# Patient Record
Sex: MALE | Race: WHITE | Employment: OTHER | URBAN - NONMETROPOLITAN AREA
[De-identification: names, ages, dates, MRNs, and addresses within clinical notes are randomized per-mention and may not be internally consistent; named-entity substitution may affect disease eponyms.]

---

## 2019-01-01 ENCOUNTER — ANESTHESIA EVENT (OUTPATIENT)
Dept: OPERATING ROOM | Age: 79
End: 2019-01-01
Payer: MEDICARE

## 2019-01-01 ENCOUNTER — TELEPHONE (OUTPATIENT)
Dept: UROLOGY | Age: 79
End: 2019-01-01

## 2019-01-01 ENCOUNTER — ANESTHESIA (OUTPATIENT)
Dept: OPERATING ROOM | Age: 79
End: 2019-01-01
Payer: MEDICARE

## 2019-01-01 ENCOUNTER — HOSPITAL ENCOUNTER (OUTPATIENT)
Age: 79
Setting detail: OUTPATIENT SURGERY
Discharge: HOME | End: 2019-12-18
Attending: UROLOGY | Admitting: UROLOGY
Payer: MEDICARE

## 2019-01-01 VITALS
SYSTOLIC BLOOD PRESSURE: 142 MMHG | TEMPERATURE: 97.2 F | DIASTOLIC BLOOD PRESSURE: 67 MMHG | BODY MASS INDEX: 27.09 KG/M2 | HEART RATE: 63 BPM | WEIGHT: 200 LBS | HEIGHT: 72 IN | OXYGEN SATURATION: 95 % | RESPIRATION RATE: 16 BRPM

## 2019-01-01 VITALS
DIASTOLIC BLOOD PRESSURE: 59 MMHG | OXYGEN SATURATION: 97 % | SYSTOLIC BLOOD PRESSURE: 109 MMHG | RESPIRATION RATE: 19 BRPM

## 2019-01-01 LAB
CASE NUMBER:: NORMAL
SPECIMEN DESCRIPTION: NORMAL
SURGICAL PATHOLOGY REPORT: NORMAL

## 2019-01-01 PROCEDURE — 3700000000 HC ANESTHESIA ATTENDED CARE: Performed by: UROLOGY

## 2019-01-01 PROCEDURE — 6370000000 HC RX 637 (ALT 250 FOR IP): Performed by: UROLOGY

## 2019-01-01 PROCEDURE — 3600000012 HC SURGERY LEVEL 2 ADDTL 15MIN: Performed by: UROLOGY

## 2019-01-01 PROCEDURE — 3700000001 HC ADD 15 MINUTES (ANESTHESIA): Performed by: UROLOGY

## 2019-01-01 PROCEDURE — 7100000011 HC PHASE II RECOVERY - ADDTL 15 MIN: Performed by: UROLOGY

## 2019-01-01 PROCEDURE — 6360000002 HC RX W HCPCS: Performed by: NURSE ANESTHETIST, CERTIFIED REGISTERED

## 2019-01-01 PROCEDURE — 2580000003 HC RX 258: Performed by: UROLOGY

## 2019-01-01 PROCEDURE — 7100000010 HC PHASE II RECOVERY - FIRST 15 MIN: Performed by: UROLOGY

## 2019-01-01 PROCEDURE — 2500000003 HC RX 250 WO HCPCS: Performed by: NURSE ANESTHETIST, CERTIFIED REGISTERED

## 2019-01-01 PROCEDURE — 3600000002 HC SURGERY LEVEL 2 BASE: Performed by: UROLOGY

## 2019-01-01 PROCEDURE — 88112 CYTOPATH CELL ENHANCE TECH: CPT

## 2019-01-01 PROCEDURE — 2709999900 HC NON-CHARGEABLE SUPPLY: Performed by: UROLOGY

## 2019-01-01 RX ORDER — LIDOCAINE HYDROCHLORIDE 20 MG/ML
INJECTION, SOLUTION EPIDURAL; INFILTRATION; INTRACAUDAL; PERINEURAL PRN
Status: DISCONTINUED | OUTPATIENT
Start: 2019-01-01 | End: 2019-01-01 | Stop reason: SDUPTHER

## 2019-01-01 RX ORDER — PROPOFOL 10 MG/ML
INJECTION, EMULSION INTRAVENOUS PRN
Status: DISCONTINUED | OUTPATIENT
Start: 2019-01-01 | End: 2019-01-01 | Stop reason: SDUPTHER

## 2019-01-01 RX ORDER — SODIUM CHLORIDE 0.9 % (FLUSH) 0.9 %
10 SYRINGE (ML) INJECTION EVERY 12 HOURS SCHEDULED
Status: DISCONTINUED | OUTPATIENT
Start: 2019-01-01 | End: 2019-01-01 | Stop reason: HOSPADM

## 2019-01-01 RX ORDER — SODIUM CHLORIDE, SODIUM LACTATE, POTASSIUM CHLORIDE, CALCIUM CHLORIDE 600; 310; 30; 20 MG/100ML; MG/100ML; MG/100ML; MG/100ML
INJECTION, SOLUTION INTRAVENOUS CONTINUOUS
Status: DISCONTINUED | OUTPATIENT
Start: 2019-01-01 | End: 2019-01-01 | Stop reason: HOSPADM

## 2019-01-01 RX ORDER — LIDOCAINE HYDROCHLORIDE 20 MG/ML
JELLY TOPICAL PRN
Status: DISCONTINUED | OUTPATIENT
Start: 2019-01-01 | End: 2019-01-01 | Stop reason: ALTCHOICE

## 2019-01-01 RX ORDER — SODIUM CHLORIDE 0.9 % (FLUSH) 0.9 %
10 SYRINGE (ML) INJECTION PRN
Status: DISCONTINUED | OUTPATIENT
Start: 2019-01-01 | End: 2019-01-01 | Stop reason: HOSPADM

## 2019-01-01 RX ADMIN — LIDOCAINE HYDROCHLORIDE 100 MG: 20 INJECTION, SOLUTION EPIDURAL; INFILTRATION; INTRACAUDAL; PERINEURAL at 12:27

## 2019-01-01 RX ADMIN — PROPOFOL 50 MG: 10 INJECTION, EMULSION INTRAVENOUS at 12:41

## 2019-01-01 RX ADMIN — PROPOFOL 50 MG: 10 INJECTION, EMULSION INTRAVENOUS at 12:27

## 2019-01-01 RX ADMIN — SODIUM CHLORIDE, POTASSIUM CHLORIDE, SODIUM LACTATE AND CALCIUM CHLORIDE: 600; 310; 30; 20 INJECTION, SOLUTION INTRAVENOUS at 12:19

## 2019-01-01 RX ADMIN — PROPOFOL 50 MG: 10 INJECTION, EMULSION INTRAVENOUS at 12:34

## 2019-01-01 ASSESSMENT — PAIN - FUNCTIONAL ASSESSMENT: PAIN_FUNCTIONAL_ASSESSMENT: 0-10

## 2019-01-01 ASSESSMENT — COPD QUESTIONNAIRES: CAT_SEVERITY: MODERATE

## 2019-01-01 ASSESSMENT — PAIN SCALES - GENERAL: PAINLEVEL_OUTOF10: 0

## 2019-06-28 ENCOUNTER — OFFICE VISIT (OUTPATIENT)
Dept: UROLOGY | Age: 79
End: 2019-06-28
Payer: MEDICARE

## 2019-06-28 VITALS
SYSTOLIC BLOOD PRESSURE: 122 MMHG | BODY MASS INDEX: 27.77 KG/M2 | WEIGHT: 205 LBS | OXYGEN SATURATION: 99 % | DIASTOLIC BLOOD PRESSURE: 62 MMHG | HEIGHT: 72 IN | HEART RATE: 64 BPM

## 2019-06-28 DIAGNOSIS — R35.0 URINARY FREQUENCY: ICD-10-CM

## 2019-06-28 DIAGNOSIS — N13.8 BPH WITH OBSTRUCTION/LOWER URINARY TRACT SYMPTOMS: ICD-10-CM

## 2019-06-28 DIAGNOSIS — N39.41 URGE INCONTINENCE: Primary | ICD-10-CM

## 2019-06-28 DIAGNOSIS — R39.15 URGENCY OF URINATION: ICD-10-CM

## 2019-06-28 DIAGNOSIS — N40.1 BPH WITH OBSTRUCTION/LOWER URINARY TRACT SYMPTOMS: ICD-10-CM

## 2019-06-28 PROCEDURE — 4004F PT TOBACCO SCREEN RCVD TLK: CPT | Performed by: UROLOGY

## 2019-06-28 PROCEDURE — 1123F ACP DISCUSS/DSCN MKR DOCD: CPT | Performed by: UROLOGY

## 2019-06-28 PROCEDURE — 4040F PNEUMOC VAC/ADMIN/RCVD: CPT | Performed by: UROLOGY

## 2019-06-28 PROCEDURE — G8598 ASA/ANTIPLAT THER USED: HCPCS | Performed by: UROLOGY

## 2019-06-28 PROCEDURE — G8419 CALC BMI OUT NRM PARAM NOF/U: HCPCS | Performed by: UROLOGY

## 2019-06-28 PROCEDURE — 99204 OFFICE O/P NEW MOD 45 MIN: CPT | Performed by: UROLOGY

## 2019-06-28 PROCEDURE — G8427 DOCREV CUR MEDS BY ELIG CLIN: HCPCS | Performed by: UROLOGY

## 2019-06-28 RX ORDER — OXYBUTYNIN CHLORIDE 10 MG/1
10 TABLET, EXTENDED RELEASE ORAL DAILY
Qty: 30 TABLET | Refills: 3 | Status: SHIPPED | OUTPATIENT
Start: 2019-06-28 | End: 2019-08-14 | Stop reason: SDUPTHER

## 2019-06-28 RX ORDER — TAMSULOSIN HYDROCHLORIDE 0.4 MG/1
CAPSULE ORAL
COMMUNITY
Start: 2019-06-12 | End: 2020-01-01 | Stop reason: SDUPTHER

## 2019-06-28 NOTE — PROGRESS NOTES
Desiree Salinas MD   Urology Clinic Consultation / New Patient Visit      Patient:  Caitlyn Howard  YOB: 1940  Date: 6/28/2019    HISTORY OF PRESENT ILLNESS:   The patient is a [de-identified] y.o. male who presents today for evaluation of the following problem(s): OAB  Overall the problem(s) : are worsening. Associated Symptoms: No dysuria, gross hematuria. Pain Severity: Pain Score:   0 - No pain    Summary of old records: BPH on flomax  (Patient's old records, notes and chart reviewed and summarized above.)    Duration: 2 months  Location: bladder  alleviating factors: none  Aggravating factors: none  Associated with: no hematuria, weak stream  Frequency: constant  On flomax for BPH for 2 years  Nocturia 6x Daytime q1-2h      Last several PSA's:  Lab Results   Component Value Date    PSA 4.12 (H) 08/09/2013       Last total testosterone:  No results found for: TESTOSTERONE    Urinalysis today:  No results found for this visit on 06/28/19.       Last BUN and creatinine:  Lab Results   Component Value Date    BUN 25 (H) 08/09/2013     Lab Results   Component Value Date    CREATININE 1.50 (H) 08/09/2013       Imaging Reviewed during this Office Visit:   (results were independently reviewed by physician and radiology report verified)    PAST MEDICAL, FAMILY AND SOCIAL HISTORY:  Past Medical History:   Diagnosis Date    Abdominal aortic aneurysm ([address]     Abnormal non-fasting glucose     Balance problem     BPH (benign prostatic hypertrophy)     CAD (coronary artery disease)     Chronic low back pain     Depression     Gait difficulty     Glaucoma     Hypertension     Hypertriglyceridemia     Liver cyst     Lumbosacral spondylosis     Osteoarthritis     Parkinson disease ([address]     Thyroid nodule     Left lobe    Tremor      Past Surgical History:   Procedure Laterality Date    ABDOMINAL AORTIC ANEURYSM REPAIR  08-    COLONOSCOPY      HEMORRHOID SURGERY      KNEE SURGERY      left side  total replacement     PTCA  01-    SKIN CANCER EXCISION       Family History   Problem Relation Age of Onset    Heart Attack Father      Outpatient Medications Marked as Taking for the 6/28/19 encounter (Office Visit) with Adam Guillen MD   Medication Sig Dispense Refill    tamsulosin (FLOMAX) 0.4 MG capsule tamsulosin 0.4 mg capsule      oxybutynin (DITROPAN XL) 10 MG extended release tablet Take 1 tablet by mouth daily 30 tablet 3    carbidopa-levodopa (SINEMET)  MG per tablet TAKE 1 TABLET BY MOUTH 4 TIMES DAILY 360 tablet 1    fenofibrate micronized (LOFIBRA) 134 MG capsule       timolol (TIMOPTIC) 0.5 % ophthalmic solution       hydrochlorothiazide (HYDRODIURIL) 12.5 MG tablet Take 12.5 mg by mouth daily      fenofibrate (TRICOR) 145 MG tablet Take 145 mg by mouth daily      ipratropium (ATROVENT) 0.02 % nebulizer solution Take 5 mg by nebulization daily       amLODIPine (NORVASC) 10 MG tablet Take 10 mg by mouth daily.  atorvastatin (LIPITOR) 40 MG tablet Take 40 mg by mouth daily.  nitroGLYCERIN (NITROSTAT) 0.4 MG SL tablet Place 0.4 mg under the tongue every 5 minutes as needed.  clopidogrel (PLAVIX) 75 MG tablet Take 75 mg by mouth daily.  acetaminophen (TYLENOL) 325 MG tablet Take 650 mg by mouth every 6 hours as needed.  carvedilol (COREG) 12.5 MG tablet Take 12.5 mg by mouth 2 times daily. Patient has no known allergies.   Social History     Tobacco Use   Smoking Status Current Some Day Smoker    Packs/day: 0.30    Years: 60.00    Pack years: 18.00   Smokeless Tobacco Never Used       Social History     Substance and Sexual Activity   Alcohol Use Yes    Alcohol/week: 0.0 oz       REVIEW OF SYSTEMS:  Constitutional: negative  Eyes: negative  Respiratory: negative  Cardiovascular: negative  Gastrointestinal: negative  Musculoskeletal: negative  Genitourinary: negative except for what is in HPI  Skin: negative   Neurological: negative  Hematological/Lymphatic: negative  Psychological: negative    Physical Exam:    This a [de-identified] y.o. male   Vitals:    06/28/19 1055   BP: 122/62   Pulse: 64   SpO2: 99%     Constitutional: Patient in no acute distress; Neuro: alert and oriented to person place and time. Psych: Mood and affect normal.  Skin: Normal  Lungs: Respiratory effort normal  Cardiovascular:  Normal peripheral pulses  Abdomen: Soft, non-tender, non-distended with no CVA, flank pain, hepatosplenomegaly or hernia. Kidneys normal.  Bladder non-tender and not distended. Lymphatics: no palpable lymphadenopathy    Assessment and Plan      1. Urge incontinence    2. Urinary frequency    3. Urgency of urination    4. BPH with obstruction/lower urinary tract symptoms           Plan:       OAB: Patient instructed to avoid bladder irritants in diet such as coffee, tea, caffeine, alcohol, carbonated beverages, spicy/acidic foods. Patient given a list of these to avoid. Will trial Ditropan 10 mg daily    BPH: continue flomax    Return in about 6 weeks (around 8/9/2019).              Rashi Gilmore MD  [facility]

## 2019-07-29 DIAGNOSIS — M25.562 CHRONIC PAIN OF LEFT KNEE: Primary | ICD-10-CM

## 2019-07-29 DIAGNOSIS — G89.29 CHRONIC PAIN OF LEFT KNEE: Primary | ICD-10-CM

## 2019-08-06 ENCOUNTER — OFFICE VISIT (OUTPATIENT)
Dept: ORTHOPEDIC SURGERY | Age: 79
End: 2019-08-06
Payer: MEDICARE

## 2019-08-06 ENCOUNTER — HOSPITAL ENCOUNTER (OUTPATIENT)
Dept: GENERAL RADIOLOGY | Age: 79
Discharge: HOME | End: 2019-08-08
Payer: MEDICARE

## 2019-08-06 VITALS
DIASTOLIC BLOOD PRESSURE: 64 MMHG | OXYGEN SATURATION: 98 % | SYSTOLIC BLOOD PRESSURE: 100 MMHG | BODY MASS INDEX: 27.77 KG/M2 | HEIGHT: 72 IN | WEIGHT: 205 LBS | HEART RATE: 64 BPM

## 2019-08-06 DIAGNOSIS — M25.562 CHRONIC PAIN OF LEFT KNEE: ICD-10-CM

## 2019-08-06 DIAGNOSIS — M16.12 PRIMARY OSTEOARTHRITIS OF LEFT HIP: Primary | ICD-10-CM

## 2019-08-06 DIAGNOSIS — G89.29 CHRONIC PAIN OF LEFT KNEE: ICD-10-CM

## 2019-08-06 PROCEDURE — 4004F PT TOBACCO SCREEN RCVD TLK: CPT | Performed by: NURSE PRACTITIONER

## 2019-08-06 PROCEDURE — G8427 DOCREV CUR MEDS BY ELIG CLIN: HCPCS | Performed by: NURSE PRACTITIONER

## 2019-08-06 PROCEDURE — G8598 ASA/ANTIPLAT THER USED: HCPCS | Performed by: NURSE PRACTITIONER

## 2019-08-06 PROCEDURE — G8419 CALC BMI OUT NRM PARAM NOF/U: HCPCS | Performed by: NURSE PRACTITIONER

## 2019-08-06 PROCEDURE — 99202 OFFICE O/P NEW SF 15 MIN: CPT | Performed by: NURSE PRACTITIONER

## 2019-08-06 PROCEDURE — 4040F PNEUMOC VAC/ADMIN/RCVD: CPT | Performed by: NURSE PRACTITIONER

## 2019-08-06 PROCEDURE — 73562 X-RAY EXAM OF KNEE 3: CPT

## 2019-08-06 PROCEDURE — 1123F ACP DISCUSS/DSCN MKR DOCD: CPT | Performed by: NURSE PRACTITIONER

## 2019-08-13 ENCOUNTER — HOSPITAL ENCOUNTER (OUTPATIENT)
Dept: GENERAL RADIOLOGY | Age: 79
Discharge: HOME | End: 2019-08-15
Payer: MEDICARE

## 2019-08-13 DIAGNOSIS — M16.12 PRIMARY OSTEOARTHRITIS OF LEFT HIP: ICD-10-CM

## 2019-08-13 PROCEDURE — 20610 DRAIN/INJ JOINT/BURSA W/O US: CPT

## 2019-08-13 PROCEDURE — 2500000003 HC RX 250 WO HCPCS

## 2019-08-13 PROCEDURE — 6360000004 HC RX CONTRAST MEDICATION: Performed by: NURSE PRACTITIONER

## 2019-08-13 PROCEDURE — 77002 NEEDLE LOCALIZATION BY XRAY: CPT

## 2019-08-13 PROCEDURE — 6360000002 HC RX W HCPCS

## 2019-08-13 RX ADMIN — IOHEXOL 2 ML: 240 INJECTION, SOLUTION INTRATHECAL; INTRAVASCULAR; INTRAVENOUS; ORAL at 15:19

## 2019-08-14 ENCOUNTER — OFFICE VISIT (OUTPATIENT)
Dept: UROLOGY | Age: 79
End: 2019-08-14
Payer: MEDICARE

## 2019-08-14 VITALS
HEART RATE: 74 BPM | DIASTOLIC BLOOD PRESSURE: 62 MMHG | WEIGHT: 205.03 LBS | SYSTOLIC BLOOD PRESSURE: 112 MMHG | HEIGHT: 72 IN | BODY MASS INDEX: 27.77 KG/M2

## 2019-08-14 DIAGNOSIS — R39.15 URGENCY OF URINATION: ICD-10-CM

## 2019-08-14 DIAGNOSIS — N40.1 BPH WITH OBSTRUCTION/LOWER URINARY TRACT SYMPTOMS: ICD-10-CM

## 2019-08-14 DIAGNOSIS — N13.8 BPH WITH OBSTRUCTION/LOWER URINARY TRACT SYMPTOMS: ICD-10-CM

## 2019-08-14 DIAGNOSIS — N39.41 URGE INCONTINENCE: Primary | ICD-10-CM

## 2019-08-14 DIAGNOSIS — R35.0 URINARY FREQUENCY: ICD-10-CM

## 2019-08-14 PROCEDURE — G8598 ASA/ANTIPLAT THER USED: HCPCS | Performed by: UROLOGY

## 2019-08-14 PROCEDURE — G8427 DOCREV CUR MEDS BY ELIG CLIN: HCPCS | Performed by: UROLOGY

## 2019-08-14 PROCEDURE — 4004F PT TOBACCO SCREEN RCVD TLK: CPT | Performed by: UROLOGY

## 2019-08-14 PROCEDURE — 4040F PNEUMOC VAC/ADMIN/RCVD: CPT | Performed by: UROLOGY

## 2019-08-14 PROCEDURE — 99214 OFFICE O/P EST MOD 30 MIN: CPT | Performed by: UROLOGY

## 2019-08-14 PROCEDURE — G8419 CALC BMI OUT NRM PARAM NOF/U: HCPCS | Performed by: UROLOGY

## 2019-08-14 PROCEDURE — 1123F ACP DISCUSS/DSCN MKR DOCD: CPT | Performed by: UROLOGY

## 2019-08-14 RX ORDER — OXYBUTYNIN CHLORIDE 10 MG/1
10 TABLET, EXTENDED RELEASE ORAL DAILY
Qty: 90 TABLET | Refills: 1 | Status: SHIPPED | OUTPATIENT
Start: 2019-08-14 | End: 2020-01-01 | Stop reason: SDUPTHER

## 2019-10-30 ENCOUNTER — OFFICE VISIT (OUTPATIENT)
Dept: UROLOGY | Age: 79
End: 2019-10-30
Payer: MEDICARE

## 2019-10-30 VITALS
RESPIRATION RATE: 16 BRPM | DIASTOLIC BLOOD PRESSURE: 58 MMHG | BODY MASS INDEX: 26.41 KG/M2 | HEART RATE: 80 BPM | WEIGHT: 195 LBS | HEIGHT: 72 IN | SYSTOLIC BLOOD PRESSURE: 102 MMHG

## 2019-10-30 DIAGNOSIS — R39.15 URGENCY OF URINATION: ICD-10-CM

## 2019-10-30 DIAGNOSIS — N13.8 BPH WITH OBSTRUCTION/LOWER URINARY TRACT SYMPTOMS: ICD-10-CM

## 2019-10-30 DIAGNOSIS — N40.1 BPH WITH OBSTRUCTION/LOWER URINARY TRACT SYMPTOMS: ICD-10-CM

## 2019-10-30 DIAGNOSIS — R35.0 URINARY FREQUENCY: ICD-10-CM

## 2019-10-30 DIAGNOSIS — N39.41 URGE INCONTINENCE: Primary | ICD-10-CM

## 2019-10-30 PROCEDURE — 4040F PNEUMOC VAC/ADMIN/RCVD: CPT | Performed by: UROLOGY

## 2019-10-30 PROCEDURE — G8598 ASA/ANTIPLAT THER USED: HCPCS | Performed by: UROLOGY

## 2019-10-30 PROCEDURE — G8484 FLU IMMUNIZE NO ADMIN: HCPCS | Performed by: UROLOGY

## 2019-10-30 PROCEDURE — 1123F ACP DISCUSS/DSCN MKR DOCD: CPT | Performed by: UROLOGY

## 2019-10-30 PROCEDURE — G8417 CALC BMI ABV UP PARAM F/U: HCPCS | Performed by: UROLOGY

## 2019-10-30 PROCEDURE — G8427 DOCREV CUR MEDS BY ELIG CLIN: HCPCS | Performed by: UROLOGY

## 2019-10-30 PROCEDURE — 1036F TOBACCO NON-USER: CPT | Performed by: UROLOGY

## 2019-10-30 PROCEDURE — 99213 OFFICE O/P EST LOW 20 MIN: CPT | Performed by: UROLOGY

## 2019-10-30 SDOH — HEALTH STABILITY: MENTAL HEALTH: HOW OFTEN DO YOU HAVE A DRINK CONTAINING ALCOHOL?: NEVER

## 2019-10-31 ENCOUNTER — TELEPHONE (OUTPATIENT)
Dept: UROLOGY | Age: 79
End: 2019-10-31

## 2020-01-01 ENCOUNTER — NURSE ONLY (OUTPATIENT)
Dept: UROLOGY | Age: 80
End: 2020-01-01
Payer: MEDICARE

## 2020-01-01 ENCOUNTER — ANESTHESIA EVENT (OUTPATIENT)
Dept: OPERATING ROOM | Age: 80
End: 2020-01-01
Payer: MEDICARE

## 2020-01-01 ENCOUNTER — HOSPITAL ENCOUNTER (OUTPATIENT)
Age: 80
Setting detail: OUTPATIENT SURGERY
Discharge: HOME | End: 2020-03-11
Attending: UROLOGY | Admitting: UROLOGY
Payer: MEDICARE

## 2020-01-01 ENCOUNTER — TELEPHONE (OUTPATIENT)
Dept: UROLOGY | Age: 80
End: 2020-01-01

## 2020-01-01 ENCOUNTER — TELEPHONE (OUTPATIENT)
Dept: GASTROENTEROLOGY | Age: 80
End: 2020-01-01

## 2020-01-01 ENCOUNTER — TELEPHONE (OUTPATIENT)
Dept: SURGERY | Age: 80
End: 2020-01-01

## 2020-01-01 ENCOUNTER — APPOINTMENT (OUTPATIENT)
Dept: GENERAL RADIOLOGY | Age: 80
End: 2020-01-01
Payer: MEDICARE

## 2020-01-01 ENCOUNTER — OFFICE VISIT (OUTPATIENT)
Dept: UROLOGY | Age: 80
End: 2020-01-01
Payer: MEDICARE

## 2020-01-01 ENCOUNTER — APPOINTMENT (OUTPATIENT)
Dept: GENERAL RADIOLOGY | Age: 80
End: 2020-01-01
Attending: UROLOGY
Payer: MEDICARE

## 2020-01-01 ENCOUNTER — HOSPITAL ENCOUNTER (EMERGENCY)
Age: 80
Discharge: HOME | End: 2020-08-19
Attending: EMERGENCY MEDICINE
Payer: MEDICARE

## 2020-01-01 ENCOUNTER — ANESTHESIA (OUTPATIENT)
Dept: OPERATING ROOM | Age: 80
End: 2020-01-01
Payer: MEDICARE

## 2020-01-01 ENCOUNTER — OFFICE VISIT (OUTPATIENT)
Dept: GASTROENTEROLOGY | Age: 80
End: 2020-01-01
Payer: MEDICARE

## 2020-01-01 ENCOUNTER — HOSPITAL ENCOUNTER (OUTPATIENT)
Age: 80
Setting detail: OBSERVATION
Discharge: HOME | End: 2020-01-23
Attending: UROLOGY | Admitting: INTERNAL MEDICINE
Payer: MEDICARE

## 2020-01-01 ENCOUNTER — INITIAL CONSULT (OUTPATIENT)
Dept: SURGERY | Age: 80
End: 2020-01-01
Payer: MEDICARE

## 2020-01-01 ENCOUNTER — PROCEDURE VISIT (OUTPATIENT)
Dept: UROLOGY | Age: 80
End: 2020-01-01
Payer: MEDICARE

## 2020-01-01 VITALS — DIASTOLIC BLOOD PRESSURE: 52 MMHG | SYSTOLIC BLOOD PRESSURE: 116 MMHG | HEART RATE: 60 BPM | TEMPERATURE: 99.2 F

## 2020-01-01 VITALS
WEIGHT: 220.02 LBS | BODY MASS INDEX: 29.8 KG/M2 | HEART RATE: 72 BPM | DIASTOLIC BLOOD PRESSURE: 58 MMHG | HEIGHT: 72 IN | OXYGEN SATURATION: 94 % | RESPIRATION RATE: 16 BRPM | SYSTOLIC BLOOD PRESSURE: 102 MMHG

## 2020-01-01 VITALS
HEART RATE: 60 BPM | BODY MASS INDEX: 29.83 KG/M2 | TEMPERATURE: 96.9 F | HEIGHT: 72 IN | DIASTOLIC BLOOD PRESSURE: 60 MMHG | SYSTOLIC BLOOD PRESSURE: 115 MMHG

## 2020-01-01 VITALS
DIASTOLIC BLOOD PRESSURE: 70 MMHG | OXYGEN SATURATION: 93 % | SYSTOLIC BLOOD PRESSURE: 154 MMHG | WEIGHT: 220 LBS | RESPIRATION RATE: 18 BRPM | TEMPERATURE: 98.2 F | BODY MASS INDEX: 29.8 KG/M2 | HEART RATE: 61 BPM | HEIGHT: 72 IN

## 2020-01-01 VITALS
RESPIRATION RATE: 18 BRPM | BODY MASS INDEX: 27.36 KG/M2 | OXYGEN SATURATION: 97 % | SYSTOLIC BLOOD PRESSURE: 131 MMHG | TEMPERATURE: 98.8 F | HEART RATE: 68 BPM | DIASTOLIC BLOOD PRESSURE: 63 MMHG | HEIGHT: 72 IN | WEIGHT: 202 LBS

## 2020-01-01 VITALS
DIASTOLIC BLOOD PRESSURE: 56 MMHG | OXYGEN SATURATION: 100 % | RESPIRATION RATE: 17 BRPM | SYSTOLIC BLOOD PRESSURE: 105 MMHG | TEMPERATURE: 97.3 F

## 2020-01-01 VITALS
HEART RATE: 69 BPM | SYSTOLIC BLOOD PRESSURE: 135 MMHG | WEIGHT: 220 LBS | HEIGHT: 72 IN | RESPIRATION RATE: 18 BRPM | DIASTOLIC BLOOD PRESSURE: 56 MMHG | OXYGEN SATURATION: 96 % | BODY MASS INDEX: 29.8 KG/M2 | TEMPERATURE: 98.8 F

## 2020-01-01 VITALS
OXYGEN SATURATION: 100 % | RESPIRATION RATE: 13 BRPM | SYSTOLIC BLOOD PRESSURE: 89 MMHG | DIASTOLIC BLOOD PRESSURE: 51 MMHG | TEMPERATURE: 96.3 F

## 2020-01-01 VITALS — BODY MASS INDEX: 27.4 KG/M2 | WEIGHT: 202 LBS | RESPIRATION RATE: 18 BRPM | TEMPERATURE: 97.3 F

## 2020-01-01 VITALS — SYSTOLIC BLOOD PRESSURE: 120 MMHG | DIASTOLIC BLOOD PRESSURE: 80 MMHG | HEART RATE: 55 BPM

## 2020-01-01 LAB
ABSOLUTE EOS #: 0 K/UL (ref 0–0.4)
ABSOLUTE EOS #: <0.03 K/UL (ref 0–0.44)
ABSOLUTE EOS #: <0.03 K/UL (ref 0–0.44)
ABSOLUTE IMMATURE GRANULOCYTE: 0 K/UL (ref 0–0.3)
ABSOLUTE IMMATURE GRANULOCYTE: 0.04 K/UL (ref 0–0.3)
ABSOLUTE IMMATURE GRANULOCYTE: 0.05 K/UL (ref 0–0.3)
ABSOLUTE LYMPH #: 0.27 K/UL (ref 1–4.8)
ABSOLUTE LYMPH #: 0.75 K/UL (ref 1.1–3.7)
ABSOLUTE LYMPH #: 0.95 K/UL (ref 1.1–3.7)
ABSOLUTE MONO #: 0.14 K/UL (ref 0.1–1.2)
ABSOLUTE MONO #: 0.18 K/UL (ref 0.1–1.2)
ABSOLUTE MONO #: 0.38 K/UL (ref 0.1–1.2)
ALBUMIN SERPL-MCNC: 4.1 G/DL (ref 3.5–5.2)
ALBUMIN/GLOBULIN RATIO: 1.1 (ref 1–2.5)
ALP BLD-CCNC: 96 U/L (ref 40–129)
ALT SERPL-CCNC: 13 U/L (ref 5–41)
ANION GAP SERPL CALCULATED.3IONS-SCNC: 10 MMOL/L (ref 9–17)
ANION GAP SERPL CALCULATED.3IONS-SCNC: 13 MMOL/L (ref 9–17)
ANION GAP SERPL CALCULATED.3IONS-SCNC: 14 MMOL/L (ref 9–17)
AST SERPL-CCNC: 19 U/L
ATYPICAL LYMPHOCYTE ABSOLUTE COUNT: 0.03 K/UL
ATYPICAL LYMPHOCYTES: 1 %
BASOPHILS # BLD: 0 % (ref 0–2)
BASOPHILS ABSOLUTE: 0 K/UL (ref 0–0.2)
BASOPHILS ABSOLUTE: <0.03 K/UL (ref 0–0.2)
BASOPHILS ABSOLUTE: <0.03 K/UL (ref 0–0.2)
BILIRUB SERPL-MCNC: 0.59 MG/DL (ref 0.3–1.2)
BILIRUBIN DIRECT: 0.21 MG/DL
BILIRUBIN, INDIRECT: 0.38 MG/DL (ref 0–1)
BUN BLDV-MCNC: 26 MG/DL (ref 8–23)
BUN BLDV-MCNC: 27 MG/DL (ref 8–23)
BUN BLDV-MCNC: 31 MG/DL (ref 8–23)
BUN/CREAT BLD: 24 (ref 9–20)
BUN/CREAT BLD: 26 (ref 9–20)
CALCIUM SERPL-MCNC: 8.9 MG/DL (ref 8.6–10.4)
CALCIUM SERPL-MCNC: 9.1 MG/DL (ref 8.6–10.4)
CALCIUM SERPL-MCNC: 9.2 MG/DL (ref 8.6–10.4)
CHLORIDE BLD-SCNC: 102 MMOL/L (ref 98–107)
CHLORIDE BLD-SCNC: 103 MMOL/L (ref 98–107)
CHLORIDE BLD-SCNC: 103 MMOL/L (ref 98–107)
CO2: 24 MMOL/L (ref 20–31)
CO2: 24 MMOL/L (ref 20–31)
CO2: 28 MMOL/L (ref 20–31)
CREAT SERPL-MCNC: 1.01 MG/DL (ref 0.7–1.2)
CREAT SERPL-MCNC: 1.08 MG/DL (ref 0.7–1.2)
CREAT SERPL-MCNC: 1.2 MG/DL (ref 0.7–1.2)
DIFFERENTIAL TYPE: ABNORMAL
EKG ATRIAL RATE: 61 BPM
EKG ATRIAL RATE: 68 BPM
EKG P AXIS: -7 DEGREES
EKG P AXIS: 34 DEGREES
EKG P-R INTERVAL: 160 MS
EKG P-R INTERVAL: 162 MS
EKG Q-T INTERVAL: 398 MS
EKG Q-T INTERVAL: 426 MS
EKG QRS DURATION: 84 MS
EKG QRS DURATION: 88 MS
EKG QTC CALCULATION (BAZETT): 423 MS
EKG QTC CALCULATION (BAZETT): 428 MS
EKG R AXIS: 40 DEGREES
EKG R AXIS: 47 DEGREES
EKG T AXIS: 32 DEGREES
EKG T AXIS: 49 DEGREES
EKG VENTRICULAR RATE: 61 BPM
EKG VENTRICULAR RATE: 68 BPM
EOSINOPHILS RELATIVE PERCENT: 0 % (ref 1–4)
EOSINOPHILS RELATIVE PERCENT: 0 % (ref 1–8)
EOSINOPHILS RELATIVE PERCENT: 1 % (ref 1–4)
GFR AFRICAN AMERICAN: >60 ML/MIN
GFR NON-AFRICAN AMERICAN: 58 ML/MIN
GFR NON-AFRICAN AMERICAN: >60 ML/MIN
GFR NON-AFRICAN AMERICAN: >60 ML/MIN
GFR SERPL CREATININE-BSD FRML MDRD: ABNORMAL ML/MIN/{1.73_M2}
GLUCOSE BLD-MCNC: 117 MG/DL (ref 70–99)
GLUCOSE BLD-MCNC: 131 MG/DL (ref 70–99)
GLUCOSE BLD-MCNC: 156 MG/DL (ref 70–99)
HCT VFR BLD CALC: 25.8 % (ref 40.7–50.3)
HCT VFR BLD CALC: 29.7 % (ref 40.7–50.3)
HCT VFR BLD CALC: 33.8 % (ref 40.7–50.3)
HEMOGLOBIN: 10.1 G/DL (ref 13–17)
HEMOGLOBIN: 7.5 G/DL (ref 13–17)
HEMOGLOBIN: 9.2 G/DL (ref 13–17)
IMMATURE GRANULOCYTES: 0 %
IMMATURE GRANULOCYTES: 1 %
IMMATURE GRANULOCYTES: 2 %
INR BLD: 1.2
LYMPHOCYTES # BLD: 10 % (ref 15–43)
LYMPHOCYTES # BLD: 18 % (ref 24–43)
LYMPHOCYTES # BLD: 35 % (ref 24–43)
MAGNESIUM: 2.2 MG/DL (ref 1.6–2.6)
MCH RBC QN AUTO: 26.9 PG (ref 25.2–33.5)
MCH RBC QN AUTO: 30.1 PG (ref 25.2–33.5)
MCH RBC QN AUTO: 30.1 PG (ref 25.2–33.5)
MCHC RBC AUTO-ENTMCNC: 29.1 G/DL (ref 25.2–33.5)
MCHC RBC AUTO-ENTMCNC: 29.9 G/DL (ref 25.2–33.5)
MCHC RBC AUTO-ENTMCNC: 31 G/DL (ref 25.2–33.5)
MCV RBC AUTO: 100.9 FL (ref 82.6–102.9)
MCV RBC AUTO: 92.5 FL (ref 82.6–102.9)
MCV RBC AUTO: 97.1 FL (ref 82.6–102.9)
METAMYELOCYTES ABSOLUTE COUNT: 0.03 K/UL
METAMYELOCYTES: 1 %
MONOCYTES # BLD: 14 % (ref 3–12)
MONOCYTES # BLD: 4 % (ref 3–12)
MONOCYTES # BLD: 5 % (ref 6–14)
MORPHOLOGY: ABNORMAL
NRBC AUTOMATED: 0 PER 100 WBC
PDW BLD-RTO: 15.2 % (ref 11.8–14.4)
PDW BLD-RTO: 15.3 % (ref 11.8–14.4)
PDW BLD-RTO: 17.2 % (ref 11.8–14.4)
PLATELET # BLD: 208 K/UL (ref 138–453)
PLATELET # BLD: 219 K/UL (ref 138–453)
PLATELET # BLD: 231 K/UL (ref 138–453)
PLATELET ESTIMATE: ABNORMAL
PMV BLD AUTO: 8.6 FL (ref 8.1–13.5)
PMV BLD AUTO: 9 FL (ref 8.1–13.5)
PMV BLD AUTO: 9.2 FL (ref 8.1–13.5)
POTASSIUM SERPL-SCNC: 3.9 MMOL/L (ref 3.7–5.3)
POTASSIUM SERPL-SCNC: 3.9 MMOL/L (ref 3.7–5.3)
POTASSIUM SERPL-SCNC: 4.1 MMOL/L (ref 3.7–5.3)
PROTHROMBIN TIME: 14.7 SEC (ref 11.5–14.2)
RBC # BLD: 2.79 M/UL (ref 4.21–5.77)
RBC # BLD: 3.06 M/UL (ref 4.21–5.77)
RBC # BLD: 3.35 M/UL (ref 4.21–5.77)
RBC # BLD: ABNORMAL 10*6/UL
SEG NEUTROPHILS: 48 % (ref 36–65)
SEG NEUTROPHILS: 77 % (ref 36–65)
SEG NEUTROPHILS: 83 % (ref 44–74)
SEGMENTED NEUTROPHILS ABSOLUTE COUNT: 1.3 K/UL (ref 1.5–8.1)
SEGMENTED NEUTROPHILS ABSOLUTE COUNT: 2.23 K/UL (ref 1.8–7.7)
SEGMENTED NEUTROPHILS ABSOLUTE COUNT: 3.31 K/UL (ref 1.5–8.1)
SODIUM BLD-SCNC: 140 MMOL/L (ref 135–144)
SODIUM BLD-SCNC: 140 MMOL/L (ref 135–144)
SODIUM BLD-SCNC: 141 MMOL/L (ref 135–144)
SURGICAL PATHOLOGY REPORT: NORMAL
SURGICAL PATHOLOGY REPORT: NORMAL
TOTAL PROTEIN: 7.7 G/DL (ref 6.4–8.3)
TROPONIN INTERP: ABNORMAL
TROPONIN INTERP: ABNORMAL
TROPONIN T: ABNORMAL NG/ML
TROPONIN T: ABNORMAL NG/ML
TROPONIN, HIGH SENSITIVITY: 28 NG/L (ref 0–22)
TROPONIN, HIGH SENSITIVITY: 31 NG/L (ref 0–22)
WBC # BLD: 2.7 K/UL (ref 3.5–11.3)
WBC # BLD: 2.7 K/UL (ref 3.5–11.3)
WBC # BLD: 4.3 K/UL (ref 3.5–11.3)
WBC # BLD: ABNORMAL 10*3/UL

## 2020-01-01 PROCEDURE — 2580000003 HC RX 258: Performed by: UROLOGY

## 2020-01-01 PROCEDURE — 52000 CYSTOURETHROSCOPY: CPT | Performed by: UROLOGY

## 2020-01-01 PROCEDURE — 99203 OFFICE O/P NEW LOW 30 MIN: CPT | Performed by: SURGERY

## 2020-01-01 PROCEDURE — 99214 OFFICE O/P EST MOD 30 MIN: CPT

## 2020-01-01 PROCEDURE — 6370000000 HC RX 637 (ALT 250 FOR IP): Performed by: INTERNAL MEDICINE

## 2020-01-01 PROCEDURE — 3600000003 HC SURGERY LEVEL 3 BASE: Performed by: UROLOGY

## 2020-01-01 PROCEDURE — 1036F TOBACCO NON-USER: CPT | Performed by: INTERNAL MEDICINE

## 2020-01-01 PROCEDURE — 3700000001 HC ADD 15 MINUTES (ANESTHESIA): Performed by: UROLOGY

## 2020-01-01 PROCEDURE — 6360000002 HC RX W HCPCS: Performed by: NURSE ANESTHETIST, CERTIFIED REGISTERED

## 2020-01-01 PROCEDURE — 1123F ACP DISCUSS/DSCN MKR DOCD: CPT | Performed by: SURGERY

## 2020-01-01 PROCEDURE — G8417 CALC BMI ABV UP PARAM F/U: HCPCS | Performed by: UROLOGY

## 2020-01-01 PROCEDURE — 71045 X-RAY EXAM CHEST 1 VIEW: CPT

## 2020-01-01 PROCEDURE — 2500000003 HC RX 250 WO HCPCS: Performed by: INTERNAL MEDICINE

## 2020-01-01 PROCEDURE — 99212 OFFICE O/P EST SF 10 MIN: CPT | Performed by: UROLOGY

## 2020-01-01 PROCEDURE — 51720 TREATMENT OF BLADDER LESION: CPT | Performed by: UROLOGY

## 2020-01-01 PROCEDURE — 3700000000 HC ANESTHESIA ATTENDED CARE: Performed by: UROLOGY

## 2020-01-01 PROCEDURE — G8417 CALC BMI ABV UP PARAM F/U: HCPCS | Performed by: INTERNAL MEDICINE

## 2020-01-01 PROCEDURE — 74230 X-RAY XM SWLNG FUNCJ C+: CPT

## 2020-01-01 PROCEDURE — 99285 EMERGENCY DEPT VISIT HI MDM: CPT

## 2020-01-01 PROCEDURE — 99232 SBSQ HOSP IP/OBS MODERATE 35: CPT | Performed by: INTERNAL MEDICINE

## 2020-01-01 PROCEDURE — 82248 BILIRUBIN DIRECT: CPT

## 2020-01-01 PROCEDURE — 3600000013 HC SURGERY LEVEL 3 ADDTL 15MIN: Performed by: UROLOGY

## 2020-01-01 PROCEDURE — G8427 DOCREV CUR MEDS BY ELIG CLIN: HCPCS | Performed by: INTERNAL MEDICINE

## 2020-01-01 PROCEDURE — 99214 OFFICE O/P EST MOD 30 MIN: CPT | Performed by: UROLOGY

## 2020-01-01 PROCEDURE — 36415 COLL VENOUS BLD VENIPUNCTURE: CPT

## 2020-01-01 PROCEDURE — 99215 OFFICE O/P EST HI 40 MIN: CPT

## 2020-01-01 PROCEDURE — 94640 AIRWAY INHALATION TREATMENT: CPT

## 2020-01-01 PROCEDURE — 85610 PROTHROMBIN TIME: CPT

## 2020-01-01 PROCEDURE — 6370000000 HC RX 637 (ALT 250 FOR IP): Performed by: NURSE PRACTITIONER

## 2020-01-01 PROCEDURE — 85025 COMPLETE CBC W/AUTO DIFF WBC: CPT

## 2020-01-01 PROCEDURE — G0378 HOSPITAL OBSERVATION PER HR: HCPCS

## 2020-01-01 PROCEDURE — 97165 OT EVAL LOW COMPLEX 30 MIN: CPT | Performed by: OCCUPATIONAL THERAPIST

## 2020-01-01 PROCEDURE — 94150 VITAL CAPACITY TEST: CPT

## 2020-01-01 PROCEDURE — 80048 BASIC METABOLIC PNL TOTAL CA: CPT

## 2020-01-01 PROCEDURE — G8484 FLU IMMUNIZE NO ADMIN: HCPCS | Performed by: UROLOGY

## 2020-01-01 PROCEDURE — 94760 N-INVAS EAR/PLS OXIMETRY 1: CPT

## 2020-01-01 PROCEDURE — 7100000011 HC PHASE II RECOVERY - ADDTL 15 MIN: Performed by: UROLOGY

## 2020-01-01 PROCEDURE — 6360000002 HC RX W HCPCS: Performed by: INTERNAL MEDICINE

## 2020-01-01 PROCEDURE — 2709999900 HC NON-CHARGEABLE SUPPLY: Performed by: UROLOGY

## 2020-01-01 PROCEDURE — 6370000000 HC RX 637 (ALT 250 FOR IP): Performed by: UROLOGY

## 2020-01-01 PROCEDURE — 7100000010 HC PHASE II RECOVERY - FIRST 15 MIN: Performed by: UROLOGY

## 2020-01-01 PROCEDURE — 88342 IMHCHEM/IMCYTCHM 1ST ANTB: CPT

## 2020-01-01 PROCEDURE — 99203 OFFICE O/P NEW LOW 30 MIN: CPT | Performed by: INTERNAL MEDICINE

## 2020-01-01 PROCEDURE — 99225 PR SBSQ OBSERVATION CARE/DAY 25 MINUTES: CPT | Performed by: INTERNAL MEDICINE

## 2020-01-01 PROCEDURE — 2720000010 HC SURG SUPPLY STERILE: Performed by: UROLOGY

## 2020-01-01 PROCEDURE — 97161 PT EVAL LOW COMPLEX 20 MIN: CPT | Performed by: PHYSICAL THERAPIST

## 2020-01-01 PROCEDURE — 92611 MOTION FLUOROSCOPY/SWALLOW: CPT | Performed by: SPEECH-LANGUAGE PATHOLOGIST

## 2020-01-01 PROCEDURE — G8427 DOCREV CUR MEDS BY ELIG CLIN: HCPCS | Performed by: UROLOGY

## 2020-01-01 PROCEDURE — 93005 ELECTROCARDIOGRAM TRACING: CPT | Performed by: EMERGENCY MEDICINE

## 2020-01-01 PROCEDURE — 51701 INSERT BLADDER CATHETER: CPT | Performed by: UROLOGY

## 2020-01-01 PROCEDURE — 1123F ACP DISCUSS/DSCN MKR DOCD: CPT | Performed by: UROLOGY

## 2020-01-01 PROCEDURE — 88307 TISSUE EXAM BY PATHOLOGIST: CPT

## 2020-01-01 PROCEDURE — 80053 COMPREHEN METABOLIC PANEL: CPT

## 2020-01-01 PROCEDURE — 6360000002 HC RX W HCPCS: Performed by: UROLOGY

## 2020-01-01 PROCEDURE — 7100000001 HC PACU RECOVERY - ADDTL 15 MIN: Performed by: UROLOGY

## 2020-01-01 PROCEDURE — 94664 DEMO&/EVAL PT USE INHALER: CPT

## 2020-01-01 PROCEDURE — 83735 ASSAY OF MAGNESIUM: CPT

## 2020-01-01 PROCEDURE — 4040F PNEUMOC VAC/ADMIN/RCVD: CPT | Performed by: UROLOGY

## 2020-01-01 PROCEDURE — 7100000000 HC PACU RECOVERY - FIRST 15 MIN: Performed by: UROLOGY

## 2020-01-01 PROCEDURE — 4040F PNEUMOC VAC/ADMIN/RCVD: CPT | Performed by: SURGERY

## 2020-01-01 PROCEDURE — 2500000003 HC RX 250 WO HCPCS: Performed by: NURSE ANESTHETIST, CERTIFIED REGISTERED

## 2020-01-01 PROCEDURE — 1036F TOBACCO NON-USER: CPT | Performed by: UROLOGY

## 2020-01-01 PROCEDURE — 6370000000 HC RX 637 (ALT 250 FOR IP)

## 2020-01-01 PROCEDURE — G8417 CALC BMI ABV UP PARAM F/U: HCPCS | Performed by: SURGERY

## 2020-01-01 PROCEDURE — 84484 ASSAY OF TROPONIN QUANT: CPT

## 2020-01-01 PROCEDURE — 4040F PNEUMOC VAC/ADMIN/RCVD: CPT | Performed by: INTERNAL MEDICINE

## 2020-01-01 PROCEDURE — 1036F TOBACCO NON-USER: CPT | Performed by: SURGERY

## 2020-01-01 PROCEDURE — 6370000000 HC RX 637 (ALT 250 FOR IP): Performed by: NURSE ANESTHETIST, CERTIFIED REGISTERED

## 2020-01-01 PROCEDURE — G8427 DOCREV CUR MEDS BY ELIG CLIN: HCPCS | Performed by: SURGERY

## 2020-01-01 PROCEDURE — 99213 OFFICE O/P EST LOW 20 MIN: CPT | Performed by: UROLOGY

## 2020-01-01 PROCEDURE — 92610 EVALUATE SWALLOWING FUNCTION: CPT | Performed by: SPEECH-LANGUAGE PATHOLOGIST

## 2020-01-01 PROCEDURE — 1123F ACP DISCUSS/DSCN MKR DOCD: CPT | Performed by: INTERNAL MEDICINE

## 2020-01-01 RX ORDER — CLOPIDOGREL BISULFATE 75 MG/1
75 TABLET ORAL DAILY
Qty: 30 TABLET | Refills: 3 | Status: ON HOLD
Start: 2020-01-01 | End: 2020-01-01 | Stop reason: HOSPADM

## 2020-01-01 RX ORDER — SODIUM CHLORIDE 0.9 % (FLUSH) 0.9 %
10 SYRINGE (ML) INJECTION PRN
Status: DISCONTINUED | OUTPATIENT
Start: 2020-01-01 | End: 2020-01-01 | Stop reason: HOSPADM

## 2020-01-01 RX ORDER — ONDANSETRON 2 MG/ML
4 INJECTION INTRAMUSCULAR; INTRAVENOUS EVERY 6 HOURS PRN
Status: DISCONTINUED | OUTPATIENT
Start: 2020-01-01 | End: 2020-01-01 | Stop reason: HOSPADM

## 2020-01-01 RX ORDER — FENOFIBRATE 160 MG/1
160 TABLET ORAL DAILY
Status: DISCONTINUED | OUTPATIENT
Start: 2020-01-01 | End: 2020-01-01

## 2020-01-01 RX ORDER — ACETAMINOPHEN 325 MG/1
650 TABLET ORAL EVERY 4 HOURS PRN
Status: DISCONTINUED | OUTPATIENT
Start: 2020-01-01 | End: 2020-01-01 | Stop reason: HOSPADM

## 2020-01-01 RX ORDER — SODIUM CHLORIDE, SODIUM LACTATE, POTASSIUM CHLORIDE, CALCIUM CHLORIDE 600; 310; 30; 20 MG/100ML; MG/100ML; MG/100ML; MG/100ML
INJECTION, SOLUTION INTRAVENOUS CONTINUOUS
Status: DISCONTINUED | OUTPATIENT
Start: 2020-01-01 | End: 2020-01-01

## 2020-01-01 RX ORDER — SODIUM CHLORIDE 0.9 % (FLUSH) 0.9 %
10 SYRINGE (ML) INJECTION EVERY 12 HOURS SCHEDULED
Status: DISCONTINUED | OUTPATIENT
Start: 2020-01-01 | End: 2020-01-01 | Stop reason: HOSPADM

## 2020-01-01 RX ORDER — DEXAMETHASONE SODIUM PHOSPHATE 4 MG/ML
INJECTION, SOLUTION INTRA-ARTICULAR; INTRALESIONAL; INTRAMUSCULAR; INTRAVENOUS; SOFT TISSUE PRN
Status: DISCONTINUED | OUTPATIENT
Start: 2020-01-01 | End: 2020-01-01 | Stop reason: SDUPTHER

## 2020-01-01 RX ORDER — IPRATROPIUM BROMIDE AND ALBUTEROL SULFATE 2.5; .5 MG/3ML; MG/3ML
SOLUTION RESPIRATORY (INHALATION)
Status: COMPLETED
Start: 2020-01-01 | End: 2020-01-01

## 2020-01-01 RX ORDER — ALBUTEROL SULFATE 2.5 MG/3ML
2.5 SOLUTION RESPIRATORY (INHALATION)
Status: DISCONTINUED | OUTPATIENT
Start: 2020-01-01 | End: 2020-01-01 | Stop reason: HOSPADM

## 2020-01-01 RX ORDER — PROPOFOL 10 MG/ML
INJECTION, EMULSION INTRAVENOUS CONTINUOUS PRN
Status: DISCONTINUED | OUTPATIENT
Start: 2020-01-01 | End: 2020-01-01 | Stop reason: SDUPTHER

## 2020-01-01 RX ORDER — FENTANYL CITRATE 50 UG/ML
INJECTION, SOLUTION INTRAMUSCULAR; INTRAVENOUS PRN
Status: DISCONTINUED | OUTPATIENT
Start: 2020-01-01 | End: 2020-01-01 | Stop reason: SDUPTHER

## 2020-01-01 RX ORDER — PROPOFOL 10 MG/ML
INJECTION, EMULSION INTRAVENOUS PRN
Status: DISCONTINUED | OUTPATIENT
Start: 2020-01-01 | End: 2020-01-01 | Stop reason: SDUPTHER

## 2020-01-01 RX ORDER — ATROPA BELLADONNA AND OPIUM 16.2; 3 MG/1; MG/1
30 SUPPOSITORY RECTAL EVERY 8 HOURS PRN
Status: DISCONTINUED | OUTPATIENT
Start: 2020-01-01 | End: 2020-01-01 | Stop reason: HOSPADM

## 2020-01-01 RX ORDER — IPRATROPIUM BROMIDE AND ALBUTEROL SULFATE 2.5; .5 MG/3ML; MG/3ML
1 SOLUTION RESPIRATORY (INHALATION)
Status: DISCONTINUED | OUTPATIENT
Start: 2020-01-01 | End: 2020-01-01 | Stop reason: HOSPADM

## 2020-01-01 RX ORDER — ALBUTEROL SULFATE 2.5 MG/3ML
2.5 SOLUTION RESPIRATORY (INHALATION)
Status: DISCONTINUED | OUTPATIENT
Start: 2020-01-01 | End: 2020-01-01

## 2020-01-01 RX ORDER — GLYCOPYRROLATE 1 MG/5 ML
SYRINGE (ML) INTRAVENOUS PRN
Status: DISCONTINUED | OUTPATIENT
Start: 2020-01-01 | End: 2020-01-01 | Stop reason: SDUPTHER

## 2020-01-01 RX ORDER — SODIUM CHLORIDE, SODIUM LACTATE, POTASSIUM CHLORIDE, CALCIUM CHLORIDE 600; 310; 30; 20 MG/100ML; MG/100ML; MG/100ML; MG/100ML
INJECTION, SOLUTION INTRAVENOUS CONTINUOUS
Status: DISCONTINUED | OUTPATIENT
Start: 2020-01-01 | End: 2020-01-01 | Stop reason: HOSPADM

## 2020-01-01 RX ORDER — OXYBUTYNIN CHLORIDE 10 MG/1
10 TABLET, EXTENDED RELEASE ORAL 2 TIMES DAILY
Qty: 180 TABLET | Refills: 3 | Status: SHIPPED | OUTPATIENT
Start: 2020-01-01 | End: 2020-01-01 | Stop reason: SDUPTHER

## 2020-01-01 RX ORDER — TAMSULOSIN HYDROCHLORIDE 0.4 MG/1
0.4 CAPSULE ORAL DAILY
Status: DISCONTINUED | OUTPATIENT
Start: 2020-01-01 | End: 2020-01-01 | Stop reason: HOSPADM

## 2020-01-01 RX ORDER — DEXAMETHASONE SODIUM PHOSPHATE 10 MG/ML
INJECTION INTRAMUSCULAR; INTRAVENOUS PRN
Status: DISCONTINUED | OUTPATIENT
Start: 2020-01-01 | End: 2020-01-01 | Stop reason: SDUPTHER

## 2020-01-01 RX ORDER — CIPROFLOXACIN 2 MG/ML
400 INJECTION, SOLUTION INTRAVENOUS
Status: COMPLETED | OUTPATIENT
Start: 2020-01-01 | End: 2020-01-01

## 2020-01-01 RX ORDER — ATORVASTATIN CALCIUM 40 MG/1
40 TABLET, FILM COATED ORAL NIGHTLY
Status: DISCONTINUED | OUTPATIENT
Start: 2020-01-01 | End: 2020-01-01 | Stop reason: HOSPADM

## 2020-01-01 RX ORDER — ROCURONIUM BROMIDE 10 MG/ML
INJECTION, SOLUTION INTRAVENOUS PRN
Status: DISCONTINUED | OUTPATIENT
Start: 2020-01-01 | End: 2020-01-01 | Stop reason: SDUPTHER

## 2020-01-01 RX ORDER — LIDOCAINE HYDROCHLORIDE 20 MG/ML
INJECTION, SOLUTION EPIDURAL; INFILTRATION; INTRACAUDAL; PERINEURAL PRN
Status: DISCONTINUED | OUTPATIENT
Start: 2020-01-01 | End: 2020-01-01 | Stop reason: SDUPTHER

## 2020-01-01 RX ORDER — OXYBUTYNIN CHLORIDE 10 MG/1
10 TABLET, EXTENDED RELEASE ORAL 2 TIMES DAILY
Qty: 180 TABLET | Refills: 1 | Status: SHIPPED | OUTPATIENT
Start: 2020-01-01 | End: 2020-01-01

## 2020-01-01 RX ORDER — SODIUM CHLORIDE 9 MG/ML
INJECTION, SOLUTION INTRAVENOUS CONTINUOUS
Status: DISCONTINUED | OUTPATIENT
Start: 2020-01-01 | End: 2020-01-01 | Stop reason: HOSPADM

## 2020-01-01 RX ORDER — HYDROCHLOROTHIAZIDE 12.5 MG/1
12.5 TABLET ORAL DAILY
Status: DISCONTINUED | OUTPATIENT
Start: 2020-01-01 | End: 2020-01-01 | Stop reason: HOSPADM

## 2020-01-01 RX ORDER — AMLODIPINE BESYLATE 5 MG/1
10 TABLET ORAL DAILY
Status: DISCONTINUED | OUTPATIENT
Start: 2020-01-01 | End: 2020-01-01 | Stop reason: HOSPADM

## 2020-01-01 RX ORDER — CLOPIDOGREL BISULFATE 75 MG/1
TABLET ORAL
COMMUNITY

## 2020-01-01 RX ORDER — LIDOCAINE HYDROCHLORIDE 10 MG/ML
INJECTION, SOLUTION EPIDURAL; INFILTRATION; INTRACAUDAL; PERINEURAL PRN
Status: DISCONTINUED | OUTPATIENT
Start: 2020-01-01 | End: 2020-01-01 | Stop reason: SDUPTHER

## 2020-01-01 RX ORDER — IPRATROPIUM BROMIDE AND ALBUTEROL SULFATE 2.5; .5 MG/3ML; MG/3ML
1 SOLUTION RESPIRATORY (INHALATION) ONCE
Status: COMPLETED | OUTPATIENT
Start: 2020-01-01 | End: 2020-01-01

## 2020-01-01 RX ORDER — SODIUM CHLORIDE FOR INHALATION 0.9 %
3 VIAL, NEBULIZER (ML) INHALATION
Status: DISCONTINUED | OUTPATIENT
Start: 2020-01-01 | End: 2020-01-01 | Stop reason: HOSPADM

## 2020-01-01 RX ORDER — ONDANSETRON 2 MG/ML
INJECTION INTRAMUSCULAR; INTRAVENOUS PRN
Status: DISCONTINUED | OUTPATIENT
Start: 2020-01-01 | End: 2020-01-01 | Stop reason: SDUPTHER

## 2020-01-01 RX ORDER — NEOSTIGMINE METHYLSULFATE 1 MG/ML
INJECTION, SOLUTION INTRAVENOUS PRN
Status: DISCONTINUED | OUTPATIENT
Start: 2020-01-01 | End: 2020-01-01 | Stop reason: SDUPTHER

## 2020-01-01 RX ORDER — ALBUTEROL SULFATE 2.5 MG/3ML
2.5 SOLUTION RESPIRATORY (INHALATION) 4 TIMES DAILY
Status: DISCONTINUED | OUTPATIENT
Start: 2020-01-01 | End: 2020-01-01 | Stop reason: HOSPADM

## 2020-01-01 RX ORDER — TIMOLOL MALEATE 5 MG/ML
1 SOLUTION/ DROPS OPHTHALMIC NIGHTLY
Status: DISCONTINUED | OUTPATIENT
Start: 2020-01-01 | End: 2020-01-01 | Stop reason: HOSPADM

## 2020-01-01 RX ORDER — TAMSULOSIN HYDROCHLORIDE 0.4 MG/1
CAPSULE ORAL
Qty: 90 CAPSULE | Refills: 1 | Status: SHIPPED | OUTPATIENT
Start: 2020-01-01

## 2020-01-01 RX ORDER — CARVEDILOL 12.5 MG/1
12.5 TABLET ORAL 2 TIMES DAILY
Status: DISCONTINUED | OUTPATIENT
Start: 2020-01-01 | End: 2020-01-01 | Stop reason: HOSPADM

## 2020-01-01 RX ORDER — FENOFIBRATE 160 MG/1
160 TABLET ORAL DAILY
Status: DISCONTINUED | OUTPATIENT
Start: 2020-01-01 | End: 2020-01-01 | Stop reason: HOSPADM

## 2020-01-01 RX ORDER — OXYBUTYNIN CHLORIDE 10 MG/1
10 TABLET, EXTENDED RELEASE ORAL 2 TIMES DAILY
Qty: 180 TABLET | Refills: 1 | Status: SHIPPED | OUTPATIENT
Start: 2020-01-01 | End: 2020-01-01 | Stop reason: SDUPTHER

## 2020-01-01 RX ADMIN — CARVEDILOL 12.5 MG: 12.5 TABLET, FILM COATED ORAL at 20:38

## 2020-01-01 RX ADMIN — ACETAMINOPHEN 650 MG: 650 SUPPOSITORY RECTAL at 13:29

## 2020-01-01 RX ADMIN — CARVEDILOL 12.5 MG: 12.5 TABLET, FILM COATED ORAL at 09:18

## 2020-01-01 RX ADMIN — TIMOLOL MALEATE 1 DROP: 5 SOLUTION OPHTHALMIC at 20:38

## 2020-01-01 RX ADMIN — CARBIDOPA AND LEVODOPA 1 TABLET: 25; 100 TABLET ORAL at 20:38

## 2020-01-01 RX ADMIN — IPRATROPIUM BROMIDE AND ALBUTEROL SULFATE 3 ML: .5; 3 SOLUTION RESPIRATORY (INHALATION) at 16:54

## 2020-01-01 RX ADMIN — FENOFIBRATE 160 MG: 160 TABLET ORAL at 09:18

## 2020-01-01 RX ADMIN — ALBUTEROL SULFATE 2.5 MG: 2.5 SOLUTION RESPIRATORY (INHALATION) at 12:21

## 2020-01-01 RX ADMIN — HYDROCHLOROTHIAZIDE 12.5 MG: 12.5 TABLET ORAL at 09:18

## 2020-01-01 RX ADMIN — TAMSULOSIN HYDROCHLORIDE 0.4 MG: 0.4 CAPSULE ORAL at 18:04

## 2020-01-01 RX ADMIN — ALBUTEROL SULFATE 2.5 MG: 2.5 SOLUTION RESPIRATORY (INHALATION) at 20:48

## 2020-01-01 RX ADMIN — BARIUM SULFATE 140 ML: 980 POWDER, FOR SUSPENSION ORAL at 11:36

## 2020-01-01 RX ADMIN — FENTANYL CITRATE 50 MCG: 50 INJECTION, SOLUTION INTRAMUSCULAR; INTRAVENOUS at 13:08

## 2020-01-01 RX ADMIN — ONDANSETRON 4 MG: 2 INJECTION INTRAMUSCULAR; INTRAVENOUS at 14:59

## 2020-01-01 RX ADMIN — FENTANYL CITRATE 25 MCG: 50 INJECTION, SOLUTION INTRAMUSCULAR; INTRAVENOUS at 15:11

## 2020-01-01 RX ADMIN — ALBUTEROL SULFATE 2.5 MG: 2.5 SOLUTION RESPIRATORY (INHALATION) at 15:59

## 2020-01-01 RX ADMIN — Medication 0.4 MG: at 13:23

## 2020-01-01 RX ADMIN — CARBIDOPA AND LEVODOPA 1 TABLET: 25; 100 TABLET ORAL at 17:01

## 2020-01-01 RX ADMIN — SODIUM CHLORIDE: 9 INJECTION, SOLUTION INTRAVENOUS at 16:58

## 2020-01-01 RX ADMIN — CARBIDOPA AND LEVODOPA 1 TABLET: 25; 100 TABLET ORAL at 12:14

## 2020-01-01 RX ADMIN — LIDOCAINE HYDROCHLORIDE 100 MG: 10 INJECTION, SOLUTION EPIDURAL; INFILTRATION; INTRACAUDAL; PERINEURAL at 12:33

## 2020-01-01 RX ADMIN — CEFAZOLIN 2 G: 330 INJECTION, POWDER, FOR SOLUTION INTRAMUSCULAR; INTRAVENOUS at 18:04

## 2020-01-01 RX ADMIN — ROCURONIUM BROMIDE 20 MG: 50 INJECTION, SOLUTION INTRAVENOUS at 13:12

## 2020-01-01 RX ADMIN — PROPOFOL 150 MG: 10 INJECTION, EMULSION INTRAVENOUS at 14:53

## 2020-01-01 RX ADMIN — CARBIDOPA AND LEVODOPA 1 TABLET: 25; 100 TABLET ORAL at 09:18

## 2020-01-01 RX ADMIN — ATORVASTATIN CALCIUM 40 MG: 40 TABLET, FILM COATED ORAL at 20:38

## 2020-01-01 RX ADMIN — DEXAMETHASONE SODIUM PHOSPHATE 4 MG: 4 INJECTION, SOLUTION INTRAMUSCULAR; INTRAVENOUS at 14:59

## 2020-01-01 RX ADMIN — TAMSULOSIN HYDROCHLORIDE 0.4 MG: 0.4 CAPSULE ORAL at 09:18

## 2020-01-01 RX ADMIN — FENTANYL CITRATE 50 MCG: 50 INJECTION, SOLUTION INTRAMUSCULAR; INTRAVENOUS at 12:57

## 2020-01-01 RX ADMIN — FENTANYL CITRATE 50 MCG: 50 INJECTION, SOLUTION INTRAMUSCULAR; INTRAVENOUS at 14:48

## 2020-01-01 RX ADMIN — ALBUTEROL SULFATE 2.5 MG: 2.5 SOLUTION RESPIRATORY (INHALATION) at 08:16

## 2020-01-01 RX ADMIN — AMLODIPINE BESYLATE 10 MG: 5 TABLET ORAL at 09:18

## 2020-01-01 RX ADMIN — CEFAZOLIN 2 G: 330 INJECTION, POWDER, FOR SOLUTION INTRAMUSCULAR; INTRAVENOUS at 01:34

## 2020-01-01 RX ADMIN — Medication 2 G: at 14:57

## 2020-01-01 RX ADMIN — Medication 2 MG: at 13:23

## 2020-01-01 RX ADMIN — CIPROFLOXACIN 400 MG: 2 INJECTION, SOLUTION INTRAVENOUS at 12:35

## 2020-01-01 RX ADMIN — PROPOFOL 200 MCG/KG/MIN: 10 INJECTION, EMULSION INTRAVENOUS at 12:33

## 2020-01-01 RX ADMIN — SODIUM CHLORIDE, POTASSIUM CHLORIDE, SODIUM LACTATE AND CALCIUM CHLORIDE: 600; 310; 30; 20 INJECTION, SOLUTION INTRAVENOUS at 13:54

## 2020-01-01 RX ADMIN — SODIUM CHLORIDE, POTASSIUM CHLORIDE, SODIUM LACTATE AND CALCIUM CHLORIDE: 600; 310; 30; 20 INJECTION, SOLUTION INTRAVENOUS at 12:23

## 2020-01-01 RX ADMIN — FENOFIBRATE 160 MG: 160 TABLET ORAL at 18:04

## 2020-01-01 RX ADMIN — SODIUM CHLORIDE, POTASSIUM CHLORIDE, SODIUM LACTATE AND CALCIUM CHLORIDE: 600; 310; 30; 20 INJECTION, SOLUTION INTRAVENOUS at 13:08

## 2020-01-01 RX ADMIN — PROPOFOL 50 MG: 10 INJECTION, EMULSION INTRAVENOUS at 12:33

## 2020-01-01 RX ADMIN — LIDOCAINE HYDROCHLORIDE 100 MG: 20 INJECTION, SOLUTION EPIDURAL; INFILTRATION; INTRACAUDAL; PERINEURAL at 14:53

## 2020-01-01 RX ADMIN — FENTANYL CITRATE 25 MCG: 50 INJECTION, SOLUTION INTRAMUSCULAR; INTRAVENOUS at 15:05

## 2020-01-01 RX ADMIN — ONDANSETRON 4 MG: 2 INJECTION INTRAMUSCULAR; INTRAVENOUS at 12:41

## 2020-01-01 RX ADMIN — HYDROCHLOROTHIAZIDE 12.5 MG: 12.5 TABLET ORAL at 18:04

## 2020-01-01 RX ADMIN — SODIUM CHLORIDE, POTASSIUM CHLORIDE, SODIUM LACTATE AND CALCIUM CHLORIDE: 600; 310; 30; 20 INJECTION, SOLUTION INTRAVENOUS at 16:10

## 2020-01-01 RX ADMIN — IPRATROPIUM BROMIDE AND ALBUTEROL SULFATE 1 AMPULE: .5; 3 SOLUTION RESPIRATORY (INHALATION) at 11:35

## 2020-01-01 RX ADMIN — DEXAMETHASONE SODIUM PHOSPHATE 10 MG: 10 INJECTION INTRAMUSCULAR; INTRAVENOUS at 12:41

## 2020-01-01 RX ADMIN — SODIUM CHLORIDE, POTASSIUM CHLORIDE, SODIUM LACTATE AND CALCIUM CHLORIDE: 600; 310; 30; 20 INJECTION, SOLUTION INTRAVENOUS at 11:38

## 2020-01-01 RX ADMIN — ACETAMINOPHEN 650 MG: 325 TABLET ORAL at 23:20

## 2020-01-01 RX ADMIN — IPRATROPIUM BROMIDE AND ALBUTEROL SULFATE 3 ML: 2.5; .5 SOLUTION RESPIRATORY (INHALATION) at 16:54

## 2020-01-01 ASSESSMENT — PAIN SCALES - GENERAL
PAINLEVEL_OUTOF10: 0
PAINLEVEL_OUTOF10: 4
PAINLEVEL_OUTOF10: 0
PAINLEVEL_OUTOF10: 0

## 2020-01-01 ASSESSMENT — PULMONARY FUNCTION TESTS
PIF_VALUE: 14
PIF_VALUE: 10
PIF_VALUE: 16
PIF_VALUE: 14
PIF_VALUE: 14
PIF_VALUE: 2
PIF_VALUE: 3
PIF_VALUE: 14
PIF_VALUE: 3
PIF_VALUE: 1
PIF_VALUE: 14
PIF_VALUE: 14
PIF_VALUE: 9
PIF_VALUE: 1
PIF_VALUE: 16
PIF_VALUE: 12
PIF_VALUE: 8
PIF_VALUE: 11
PIF_VALUE: 7
PIF_VALUE: 13
PIF_VALUE: 14
PIF_VALUE: 1
PIF_VALUE: 10
PIF_VALUE: 7
PIF_VALUE: 8
PIF_VALUE: 8
PIF_VALUE: 7
PIF_VALUE: 14
PIF_VALUE: 3
PIF_VALUE: 10
PIF_VALUE: 15
PIF_VALUE: 1
PIF_VALUE: 16
PIF_VALUE: 15
PIF_VALUE: 13
PIF_VALUE: 7
PIF_VALUE: 13
PIF_VALUE: 13
PIF_VALUE: 1
PIF_VALUE: 0
PIF_VALUE: 14
PIF_VALUE: 13
PIF_VALUE: 8
PIF_VALUE: 14
PIF_VALUE: 9
PIF_VALUE: 19
PIF_VALUE: 5
PIF_VALUE: 8
PIF_VALUE: 2
PIF_VALUE: 14
PIF_VALUE: 1
PIF_VALUE: 8
PIF_VALUE: 1
PIF_VALUE: 7
PIF_VALUE: 9
PIF_VALUE: 12
PIF_VALUE: 14
PIF_VALUE: 14
PIF_VALUE: 15
PIF_VALUE: 9
PIF_VALUE: 14
PIF_VALUE: 14
PIF_VALUE: 13
PIF_VALUE: 6
PIF_VALUE: 12
PIF_VALUE: 10
PIF_VALUE: 9
PIF_VALUE: 9
PIF_VALUE: 14
PIF_VALUE: 13
PIF_VALUE: 14
PIF_VALUE: 0
PIF_VALUE: 8
PIF_VALUE: 6
PIF_VALUE: 14
PIF_VALUE: 15
PIF_VALUE: 14
PIF_VALUE: 10
PIF_VALUE: 16
PIF_VALUE: 13
PIF_VALUE: 13
PIF_VALUE: 3
PIF_VALUE: 14
PIF_VALUE: 11
PIF_VALUE: 1
PIF_VALUE: 14
PIF_VALUE: 9
PIF_VALUE: 14
PIF_VALUE: 12

## 2020-01-01 ASSESSMENT — ENCOUNTER SYMPTOMS
DIARRHEA: 0
SHORTNESS OF BREATH: 1
BACK PAIN: 0
VOMITING: 0
WHEEZING: 1
ABDOMINAL PAIN: 0
SORE THROAT: 0
COUGH: 1
ALLERGIC/IMMUNOLOGIC NEGATIVE: 1
TROUBLE SWALLOWING: 0
EYES NEGATIVE: 1
CHOKING: 1
BLOOD IN STOOL: 1
NAUSEA: 0
SHORTNESS OF BREATH: 1

## 2020-01-01 ASSESSMENT — COPD QUESTIONNAIRES: CAT_SEVERITY: MODERATE

## 2020-01-01 ASSESSMENT — PAIN - FUNCTIONAL ASSESSMENT
PAIN_FUNCTIONAL_ASSESSMENT: 0-10
PAIN_FUNCTIONAL_ASSESSMENT: 0-10

## 2020-01-22 PROBLEM — D49.4 BLADDER TUMOR: Status: ACTIVE | Noted: 2020-01-01

## 2020-01-22 NOTE — H&P
History and Physical    Patient:  Teodora Irving  MRN: [id number]  YOB: 1940    CHIEF COMPLAINT:  Bladder tumor    HISTORY OF PRESENT ILLNESS:   The patient is a 78 y.o. male who presents with bladder tumor    Patient's old records, notes and chart reviewed and summarized above. Past Medical History:    Past Medical History:   Diagnosis Date    Abdominal aortic aneurysm (HCC)     Abnormal non-fasting glucose     Balance problem     BPH (benign prostatic hypertrophy)     CAD (coronary artery disease)     Chronic low back pain     Depression     Gait difficulty     Glaucoma     Hypertension     Hypertriglyceridemia     Liver cyst     Lumbosacral spondylosis     Osteoarthritis     Parkinson disease ([address]     Thyroid nodule     Left lobe    Tremor        Past Surgical History:    Past Surgical History:   Procedure Laterality Date    ABDOMINAL AORTIC ANEURYSM REPAIR  08-    COLONOSCOPY      CYSTOSCOPY N/A 12/18/2019    CYSTO performed by Anushka Blake MD at [address] [place] [address]      left side  total replacement     PTCA  01-    SKIN CANCER EXCISION       Medications Prior to Admission:    Prior to Admission medications    Medication Sig Start Date End Date Taking?  Authorizing Provider   tamsulosin (FLOMAX) 0.4 MG capsule tamsulosin 0.4 mg capsule 6/12/19  Yes Historical Provider, MD   carbidopa-levodopa (SINEMET)  MG per tablet TAKE 1 TABLET BY MOUTH 4 TIMES DAILY 6/2/16  Yes Hamilton Manzo MD   fenofibrate micronized (LOFIBRA) 134 MG capsule  9/15/15  Yes Historical Provider, MD   timolol (TIMOPTIC) 0.5 % ophthalmic solution  11/20/15  Yes Historical Provider, MD   hydrochlorothiazide (HYDRODIURIL) 12.5 MG tablet Take 12.5 mg by mouth daily   Yes Historical Provider, MD   fenofibrate (TRICOR) 145 MG tablet Take 145 mg by mouth daily   Yes Historical Provider, MD   ipratropium (ATROVENT) 0.02 % nebulizer solution Take 5 Relationships    Social connections:     Talks on phone: Not on file     Gets together: Not on file     Attends Scientologist service: Not on file     Active member of club or organization: Not on file     Attends meetings of clubs or organizations: Not on file     Relationship status: Not on file    Intimate partner violence:     Fear of current or ex partner: Not on file     Emotionally abused: Not on file     Physically abused: Not on file     Forced sexual activity: Not on file   Other Topics Concern    Not on file   Social History Narrative    Not on file       Family History:    Family History   Problem Relation Age of Onset    Heart Attack Father        REVIEW OF SYSTEMS:  Constitutional: negative  Eyes: negative  Respiratory: negative  Cardiovascular: negative  Gastrointestinal: negative  Genitourinary: see HPI  Musculoskeletal: negative  Skin: negative   Neurological: negative  Hematological/Lymphatic: negative  Psychological: negative    Physical Exam:      Patient Vitals for the past 24 hrs:   BP Temp Temp src Pulse Resp SpO2 Height Weight   01/22/20 1127 (!) 103/57 97.5 °F (36.4 °C) Oral 59 18 97 % 6' (1.829 m) 200 lb (90.7 kg)     Constitutional: Patient in no acute distress; Neuro: alert and oriented to person place and time. Psych: Mood and affect normal.  Skin: Normal  Lungs: Respiratory effort normal, CTA  Cardiovascular:  Normal peripheral pulses; no murmur  Abdomen: Soft, non-tender, non-distended with no CVA, flank pain, hepatosplenomegaly or hernia. Kidneys normal.  Bladder non-tender and not distended. LABS:   No results for input(s): WBC, HGB, HCT, MCV, PLT in the last 72 hours. No results for input(s): NA, K, CL, CO2, PHOS, BUN, CREATININE in the last 72 hours.     Invalid input(s): CA  Lab Results   Component Value Date    PSA 4.12 (H) 08/09/2013           Urinalysis: No results for input(s): COLORU, PHUR, LABCAST, WBCUA, RBCUA, MUCUS, TRICHOMONAS, YEAST, BACTERIA, CLARITYU,

## 2020-01-22 NOTE — FLOWSHEET NOTE
Pt arrives from PACU to PCU. Pt drowsy and unable to give name. \"No\" given when questioned pain but otherwise unable to converse. Pt stares blankly during care and then quickly closes eyes.

## 2020-01-22 NOTE — ANESTHESIA PRE PROCEDURE
Department of Anesthesiology  Preprocedure Note       Name:  Lux Solorzano   Age:  78 y.o.  :  1940                                          MRN:  [id number]         Date:  2020      Surgeon: Elijah Bingham):  Ofelia Pitts MD    Procedure: CYSTOSCOPY TURBT (N/A )    Medications prior to admission:   Prior to Admission medications    Medication Sig Start Date End Date Taking? Authorizing Provider   oxybutynin (DITROPAN XL) 10 MG extended release tablet Take 1 tablet by mouth daily  Patient not taking: Reported on 10/30/2019 8/14/19 11/12/19  Ofelia Pitts MD   tamsulosin (FLOMAX) 0.4 MG capsule tamsulosin 0.4 mg capsule 19   Historical Provider, MD   carbidopa-levodopa (SINEMET)  MG per tablet TAKE 1 TABLET BY MOUTH 4 TIMES DAILY 7/3/93   Joyce Beverly MD   fenofibrate micronized (LOFIBRA) 134 MG capsule  9/15/15   Historical Provider, MD   timolol (TIMOPTIC) 0.5 % ophthalmic solution  11/20/15   Historical Provider, MD   hydrochlorothiazide (HYDRODIURIL) 12.5 MG tablet Take 12.5 mg by mouth daily    Historical Provider, MD   fenofibrate (TRICOR) 145 MG tablet Take 145 mg by mouth daily    Historical Provider, MD   ipratropium (ATROVENT) 0.02 % nebulizer solution Take 5 mg by nebulization daily  2/18/15   Historical Provider, MD   amLODIPine (NORVASC) 10 MG tablet Take 10 mg by mouth daily. Historical Provider, MD   atorvastatin (LIPITOR) 40 MG tablet Take 40 mg by mouth daily. Historical Provider, MD   nitroGLYCERIN (NITROSTAT) 0.4 MG SL tablet Place 0.4 mg under the tongue every 5 minutes as needed. Historical Provider, MD   clopidogrel (PLAVIX) 75 MG tablet Take 75 mg by mouth daily. Historical Provider, MD   acetaminophen (TYLENOL) 325 MG tablet Take 650 mg by mouth every 6 hours as needed. Historical Provider, MD   carvedilol (COREG) 12.5 MG tablet Take 12.5 mg by mouth 2 times daily.     Historical Provider, MD       Current medications:    No current facility-administered medications for this visit. No current outpatient medications on file.      Facility-Administered Medications Ordered in Other Visits   Medication Dose Route Frequency Provider Last Rate Last Dose    ipratropium-albuterol (DUONEB) nebulizer solution 1 ampule  1 ampule Inhalation Once Bao Horton MD           Allergies:  No Known Allergies    Problem List:    Patient Active Problem List   Diagnosis Code    AAA (abdominal aortic aneurysm) without rupture ([facility] I71.4    Carotid stenosis, asymptomatic I65.29    Diabetes mellitus with peripheral artery disease (HCC) E11.51    Claudication ([facility] I73.9    CAD (coronary artery disease) I25.10    Thyroid nodule E04.1    Liver cyst K76.89    Hypertriglyceridemia E78.1    Abdominal aortic aneurysm (HCC) I71.4    Hypertension I10    Glaucoma H40.9    Lumbosacral spondylosis M47.817    Osteoarthritis M19.90    Chronic low back pain M54.5, G89.29    Abnormal non-fasting glucose R73.09    Depression F32.9    Parkinson disease ([facility] G20    Tremor R25.1    Gait difficulty R26.9    Balance problem R26.89       Past Medical History:        Diagnosis Date    Abdominal aortic aneurysm (HCC)     Abnormal non-fasting glucose     Balance problem     BPH (benign prostatic hypertrophy)     CAD (coronary artery disease)     Chronic low back pain     Depression     Gait difficulty     Glaucoma     Hypertension     Hypertriglyceridemia     Liver cyst     Lumbosacral spondylosis     Osteoarthritis     Parkinson disease ([facility]     Thyroid nodule     Left lobe    Tremor        Past Surgical History:        Procedure Laterality Date    ABDOMINAL AORTIC ANEURYSM REPAIR  08-    COLONOSCOPY      CYSTOSCOPY N/A 12/18/2019    CYSTO performed by Bao Horton MD at [address] [place] [address]      left side  total replacement     PTCA  01-   [facility] SKIN CANCER EXCISION         Social History:    Social [id number], BEART, [id number]     Type & Screen (If Applicable):  No results found for: LABABO, [address]    Anesthesia Evaluation  Patient summary reviewed no history of anesthetic complications:   Airway: Mallampati: II  TM distance: >3 FB   Neck ROM: limited  Mouth opening: > = 3 FB Dental:    (+) partials      Pulmonary:normal exam    (+) COPD: moderate and no interval change,                            ROS comment: preop breathing treatment   Cardiovascular:  Exercise tolerance: poor (<4 METS),   (+) hypertension:, CAD: no interval change,       ECG reviewed      Echocardiogram reviewed    Cleared by cardiology              Neuro/Psych:   (+) neuromuscular disease: Parkinson's disease, depression/anxiety             GI/Hepatic/Renal:   (+) liver disease:, renal disease: CRI,           Endo/Other:    (+) DiabetesType II DM, , : arthritis: OA., .                 Abdominal:           Vascular:   + PVD, aortic or cerebral, . ROS comment: AAA repair, carotid stenosis. Anesthesia Plan      TIVA and general     ASA 4       Induction: intravenous. MIPS: Prophylactic antiemetics administered. Anesthetic plan and risks discussed with patient and spouse.       Plan discussed with surgical team.                  JAZMÍN Hernandez - CRNA   1/22/2020

## 2020-01-22 NOTE — PROGRESS NOTES
[facility]arlyn Kinney[facility] Bladder [facility] [D49.4] . Vitals:    01/22/20 1659   BP: 135/67   Pulse: 56   Resp: 20   Temp:    SpO2: 97%   . Patients home meds are   Prior to Admission medications    Medication Sig Start Date End Date Taking? Authorizing Provider   tamsulosin (FLOMAX) 0.4 MG capsule tamsulosin 0.4 mg capsule 6/12/19  Yes Historical Provider, MD   carbidopa-levodopa (SINEMET)  MG per tablet TAKE 1 TABLET BY MOUTH 4 TIMES DAILY 6/2/16  Yes Olivier Donald MD   fenofibrate micronized (LOFIBRA) 134 MG capsule  9/15/15  Yes Historical Provider, MD   timolol (TIMOPTIC) 0.5 % ophthalmic solution  11/20/15  Yes Historical Provider, MD   hydrochlorothiazide (HYDRODIURIL) 12.5 MG tablet Take 12.5 mg by mouth daily   Yes Historical Provider, MD   fenofibrate (TRICOR) 145 MG tablet Take 145 mg by mouth daily   Yes Historical Provider, MD   ipratropium (ATROVENT) 0.02 % nebulizer solution Take 5 mg by nebulization daily  2/18/15  Yes Historical Provider, MD   amLODIPine (NORVASC) 10 MG tablet Take 10 mg by mouth daily. Yes Historical Provider, MD   atorvastatin (LIPITOR) 40 MG tablet Take 40 mg by mouth daily. Yes Historical Provider, MD   acetaminophen (TYLENOL) 325 MG tablet Take 650 mg by mouth every 6 hours as needed. Yes Historical Provider, MD   carvedilol (COREG) 12.5 MG tablet Take 12.5 mg by mouth 2 times daily. Yes Historical Provider, MD   oxybutynin (DITROPAN XL) 10 MG extended release tablet Take 1 tablet by mouth daily  Patient not taking: Reported on 10/30/2019 8/14/19 11/12/19  Юлия Edwards MD   nitroGLYCERIN (NITROSTAT) 0.4 MG SL tablet Place 0.4 mg under the tongue every 5 minutes as needed.     Historical Provider, MD   .  Recent Surgical History: Lower Abdominal = 2     Assessment     Peak Flow (asthma only)    Predicted: 505  Personal Best: 68  PEF 68  % Predicted 13%  Peak Flow : Less than 50% = 4    FEV1/FVC    FEV1 Predicted 3.37      FEV1 [id number]

## 2020-01-22 NOTE — PROGRESS NOTES
01/22/2020    BASOPCT 0 01/22/2020    MONOSABS 0.14 01/22/2020    LYMPHSABS 0.27 01/22/2020    EOSABS 0.00 01/22/2020    BASOSABS 0.00 01/22/2020    DIFFTYPE NOT REPORTED 01/22/2020     BMP:    Lab Results   Component Value Date     01/22/2020    K 4.1 01/22/2020     01/22/2020    CO2 24 01/22/2020    BUN 27 01/22/2020    LABALBU 4.1 01/22/2020    CREATININE 1.01 01/22/2020    CALCIUM 9.2 01/22/2020    GFRAA >60 01/22/2020    LABGLOM >60 01/22/2020    GLUCOSE 117 01/22/2020           Physical Exam:  Vitals: /67   Pulse 56   Temp 97.7 °F (36.5 °C) (Oral)   Resp 20   Ht 6' (1.829 m)   Wt 200 lb (90.7 kg)   SpO2 97%   BMI 27.12 kg/m²   24 hour intake/output:    Intake/Output Summary (Last 24 hours) at 1/22/2020 1830  Last data filed at 1/22/2020 1801  Gross per 24 hour   Intake 1690 ml   Output 5550 ml   Net -3860 ml     Last 3 weights: Wt Readings from Last 3 Encounters:   01/22/20 200 lb (90.7 kg)   12/18/19 200 lb (90.7 kg)   10/30/19 195 lb (88.5 kg)     HEENT: O2 NC----, Normocephalic and Atraumatic  Neck: Supple, No Masses, Tenderness, Nodularity and No Lymphadenopathy  Chest/Lungs: actively coughing when trying to eat corn and other solids--seemed to handle thicker liquids better, Rhonchi Present and Expiratory Wheezes  Cardiac: Regular Rate and Rhythm  GI/Abdomen: Bowel Sounds Present and Soft, Non-tender, without Guarding or Rebound Tenderness  :   3-way with continuous bladder irrigation---color ranging from pale cherry to clear---no clots at this time  EXT/Skin: No Edema, No Cyanosis and No Clubbing  Neuro: alert--answers question--hands--fingers clumsy---active coughing and choking with attempts of eating       Assessment:    Active Problems:    Bladder tumor  Resolved Problems:    * No resolved hospital problems.  Twylla Pond  [address]  Cliff Gleason, [place]; Diomedes Carrion, Urology]  FULL CODE      no anticoagulation--- bleeding    continuous irrigation catheter Anti-infectives:    POD _____ TURBT---1.22.2020---[facility]  Bladder tumor----7 cm---1.22.2020        Cystoscopy---12.18.2019    Progressive Supranuclear Palsy---PSP--previous diagnosis of Parkinsons         Dysphagia---possible aspiration          Gait-balance instability         Weakness---scooter-bound         Clumsiness--gross and fine motor          Dysarthria   Hypertension  Hypertriglyceridemia  Diabetes Mellitus Type 2  ASCVD         Cardiac catheterization--stent x 2----2017  Peripheral arterial disease         AAA----stent repair---2012         Carotid stenosis---asymptomatic         Claudication  Depression   Tobacco abuse---quit---1. 1.2019  PMH:  chronic low back pain, glaucoma, liver cyst, lumbosacral spondylosis, OA,              left-sided thyroid nodule, tremor, COPD  PSH:   see above, PTCA--2013---site?, colonoscopy, hemorrhoidectomy,             left TKA, skin cancer excision    Allergies:   NKDA       Plan:  1. Home medications reviewed  2. Dysphagia----speech evaluation----pureed consistency and nectar thickened liquids  3. Receiving bladder irrigation  4. Glaucoma--home regimen--eye gtts  5.   See orders     Electronically signed by [email or web address] on 1/22/2020 at 6:30 PM    Hospitalist

## 2020-01-23 NOTE — PROGRESS NOTES
Non-oral nutrition. PHARYNGEAL PHASE: [] WFL  [x] Impaired   [] Absent Swallow                [x] Delayed Swallow-bolus fills vallecula before pharyngeal swallow initiated in all trials               [] Decreased airway protection   [] Decreased epiglottic inversion     [] Decreased hyolaryngeal elevation   [] Residue in the valleculae               [] Residue in the pyriform sinus    [] Cricopharyngeal dysfunction              [] Residue along posterior pharyngeal wall     [] Residue along the ariepiglottic folds    [x] Decreased pharyngeal contraction   [] OTHER:    PHARYNGEAL PHASE SABINO SCORE: (Dysphagia outcome and severity scale)  [] 7 = Normal in all situations; Normal diet; No strategies  [] 6 = WFL / Mod I; May have mild pharyngeal delay or residue but clears spontaneously; No aspiration or laryngeal penetration  [x] 5 = Mild dysphagia:  May need one consistency restricted; May have one or more of the following:  Aspiration with thin - cough to clear; Airway penetration midway to the vocal cords with one or more consistency or to the vocal folds with one consistency, but clears spontaneously; Residue in the pharynx clears spontaneously. [] 4 = Mild - Moderate dysphagia: One or two consistencies restricted; May exhibit one or more of the following:  Residue clears with cue; Aspiration of one consistency with weak or no reflexive cough; Laryngeal penetration to the vocal cords with cough with two consistencies; Laryngeal penetration to the vocal cords without cough on one consistency. [] 3 = Moderate dysphagia:  Two or more diet consistencies restricted; May exhibit one or more of the following: Moderate residue clears with cue; Airway penetration to the level of the vocal folds without cough with two or more consistencies; Aspiration with two consistencies with weak or no reflexive cough; Aspiration of one consistency, no cough and airway penetration with one consistency, no cough.   [] 2 = Moderately Severe dysphagia: Tolerates at least one consistency safely with total use of strategies; Non-oral nutrition; Severe residue unable to clear; Aspiration with two or more consistencies with no cough; Aspiration with one or more consistency, no cough and airway penetration to the vocal folds with one or more consistency, no cough. [] 1 = Severe dysphagia:  NPO; Unable to tolerate any po safely; Severe residue unable to clear; Silent aspiration with two or more consistencies, non-functional cough;  OR Unable to achieve a swallow.         SIGNS AND SYMPTOMS OF LARYNGEAL PENETRATION / ASPIRATION:  [x] No evidence of laryngeal penetration  [x] No evidence of aspiration  [] Laryngeal penetration evident with:  [] Audible aspiration evident with:  [] Silent aspiration evident with:    PENETRATION-ASPIRATION SCALE (PAS):  [x] 1 = Material does not enter the airway  [] 2 = Material enters the airway, remains above the vocal folds, and is ejected from the airway  [] 3 = Material enters the airway, remains above the vocal folds, and is not ejected from airway  [] 4 = Material enters the airway, contacts the vocal folds, and is ejected from the airway  [] 5 = Material enters the airway, contacts the vocal folds, and is not ejected from the airway  [] 6 = Material enters the airway, passes below the vocal folds, and is ejected into the larynx or out of the airway  [] 7 = Material enters the airway, passes below the vocal folds, and is not ejected from the trachea despite effort  [] 8 = Material enters the airway, passes below the vocal folds, and no effort is made to eject    ESOPHAGEAL PHASE:   [x] No significant findings  [] See Radiology Report for details  [] Recommend further esophageal testing    ATTEMPTED TECHNIQUES:                                 Comments:  [x]Small bolus size [] Effective []Not Effective    []Straw [] Effective []Not Effective    []Cup [] Effective []Not Effective    []Chin tuck [] Effective Discussion  [] Demonstration [] Hand-out     [] Other:     Evaluation of Education:     [x] Verbalizes understanding      [] Demonstrates with assistance     [] Demonstrates without assistance                                  []Needs further instruction      [] No evidence of learning                                        [] Family not present    PATIENT GOALS: [] Pt did not state; will further assess during treatment. [] Return to the least restricted diet possible     [x] Return to previous level of function     [] Other:    PLAN / TREATMENT RECOMMENDATIONS:  [x] No further speech therapy services indicated. [] Speech Therapy evaluation to assess speech, language, cognition and/or voice  [] Skilled dysphagia treatment ___ times per week for ___ weeks.   [] OTHER:      Electronically signed by:     Renee Rodriguez MS, CCC-SLP         Date: 1/23/2020

## 2020-01-23 NOTE — PROGRESS NOTES
[facility] Inpatient Speech Therapy  Phone: [phone]  Fax: [phone]        BEDSIDE SWALLOW EVALUATION    YOB: 1940  Gender: male  MRN:  [id number]  Referring Physician: Dr. Lela Lorenzo  Diagnosis: dysphagia  Secondary Diagnosis: dysphagia    History of Present Illness/Injury: Patient is an 78 y.o. male who presents s/p removal of bladder tumor. Patient noted to have coughing with liquids and solids. History significant for Progressive Supranuclear Palsy.       Past Medical History:   Diagnosis Date    Abdominal aortic aneurysm (HCC)     Abnormal non-fasting glucose     Balance problem     BPH (benign prostatic hypertrophy)     CAD (coronary artery disease)     Chronic low back pain     Depression     Gait difficulty     Glaucoma     Hypertension     Hypertriglyceridemia     Liver cyst     Lumbosacral spondylosis     Osteoarthritis     Parkinson disease ([facility] [address]     Thyroid nodule     Left lobe    Tremor        TIME IN: 0845 am  TIME OUT: 0900 am  TOTAL TIME: 15 minutes     Current Diet: [] NPO [] Regular diet  [] Soft and bite sized (Dysphagia III)  [] Minced and moist (Dysphagia II)   [x] Pureed (Dysphagia I)  [] Liquidised         Current Liquids: [] Thin  [x] Mildly Thick (Nectar thick)  [] Moderately Thick (Honey thick) [] Extremely Thick (Spoon-Pudding)    *Patient was on regular with thin liquids prior to hospital admission    Respiratory Status: [x] Independent [] Nasal Cannula [] Oxygen Mask                  [] Tracheostomy [] Other:      [] Ventilator/Settings:    Behavioral Observation: [x] Alert  [x] Oriented [] Confused      [] Lethargic [] Dysarthric       [] Limited Direction Following        [] Agitated      [] Other:    ORAL MECHANISM EVALUATION:               Comments:  Facial /Labial [x]WFL []Impaired []DNT    Lingual []WFL [x]Impaired []DNT Reduced rate and coordination with alternating lateralization   Dentition [x]WFL []Impaired []DNT Upper/lower dentures   Velum [x]WFL []Impaired []DNT    Vocal Quality [x]WFL []Impaired []DNT    Sensation [x]WFL []Impaired []DNT    Cough [x]WFL []Impaired []DNT    Gag []WFL []Impaired [x]DNT    Other: []WFL []ImpaIred []DNT        PATIENT WAS EVALUATED USING:  [x] Thin liquid    [] Mildly thick (Nectar thick) liquid  [] Moderately thick (Honey thick) liquid/Liquidised  [] Extremely thick (Pudding thick) liquid  [x] Solid   [x] Soft and bite sized  [] Minced and moist  [x] Puree    ORAL PHASE: [x] WFL    [] Impaired:   [] Loss of bolus from lips    [] Impaired AP movement   [] Pocketing Left     [] Pocketing Right    [] Lingual Pumping    [] Impaired Mastication   [] Reduced Bolus Formation    [] Impaired Oral Initiation    [] OTHER:    PHARYNGEAL PHASE:   [x] WFL: Pharyngeal phase appears WFLs, but cannot completely rule out pharyngeal phase deficits from a bedside swallow evaluation alone. [] Impaired:   [] Delayed Swallow     [] Decreased Hyolaryngeal Elevation   [] Audible Swallow   [] Suspected Pharyngeal Residue due to spontaneous multiple swallow. [] OTHER:    SIGNS AND SYMPTOMS OF LARYNGEAL PENETRATION / ASPIRATION:  [x] NO sign/symptoms of aspiration evident with this evaluation, but cannot rule out silent aspiration. [] Throat Clear    [] Immediate Cough  [] Delayed Cough  [] Wet Vocal Quality  [] Change in Pulmonary Status   [] OTHER:    IMPRESSIONS: Patient presented at bedside with swallow function WFL. No overt s/sx aspiration during bedside assessment; however, based on reports of active coughing and choking with attempts of eating and hx PSP, further assessment warranted. RECOMMENDATIONS:   Modified Barium Swallow:  [] Is indicated to further assess         [] Is NOT indicated at this time; Will recommend as appropriate.     DIET RECOMMENDATIONS: [] NPO [] Regular diet [] Soft and bite sized (Dysphagia III)  [] Minced and moist (Dysphagia II)   [x] Pureed (Dysphagia I)  [] Liquidised     LIQUID

## 2020-01-23 NOTE — PROGRESS NOTES
Hospitalist Progress Note    Patient:  Marce Ibanez     YOB: 1940    MRN: [id number]   Admit date: 1/22/2020     Acct: [de-identified]     PCP: Fly Cox    CC--Interval History:     POD 1 TURBT---home---1.23.2020    PSP---progressive supranuclear palsy---1.22.2020---patient had episodes of active gagging and choking---very concerning for dysphagia which may accompany PSP---MBS--modified barium swallow today, however, indicated no aspiration with any of the tested consistencies--would nevertheless suggest that foods in particles such as corn not be eaten    See note below       All other ROS negative except noted in HPI    Diet:  DIET CARDIAC;  Dysphagia Pureed; Mildly Thick (Nectar)    Medications:  Scheduled Meds:   sodium chloride flush  10 mL Intravenous 2 times per day    amLODIPine  10 mg Oral Daily    atorvastatin  40 mg Oral Nightly    carbidopa-levodopa  1 tablet Oral 4x Daily    carvedilol  12.5 mg Oral BID    hydrochlorothiazide  12.5 mg Oral Daily    tamsulosin  0.4 mg Oral Daily    fenofibrate  160 mg Oral Daily    timolol  1 drop Both Eyes Nightly    albuterol  2.5 mg Nebulization 4x daily     Continuous Infusions:   sodium chloride 50 mL/hr at 01/22/20 1658     PRN Meds:sodium chloride flush, ondansetron, opium-belladonna, sodium chloride nebulizer, albuterol, acetaminophen    Objective:  Labs:  CBC with Differential:    Lab Results   Component Value Date    WBC 4.3 01/23/2020    RBC 3.06 01/23/2020    HGB 9.2 01/23/2020    HCT 29.7 01/23/2020     01/23/2020    MCV 97.1 01/23/2020    MCH 30.1 01/23/2020    MCHC 31.0 01/23/2020    RDW 15.2 01/23/2020    METASPCT 1 01/22/2020    LYMPHOPCT 18 01/23/2020    MONOPCT 4 01/23/2020    BASOPCT 0 01/23/2020    MONOSABS 0.18 01/23/2020    LYMPHSABS 0.75 01/23/2020    EOSABS <0.03 01/23/2020    BASOSABS <0.03 01/23/2020    DIFFTYPE NOT REPORTED 01/23/2020     BMP:    Lab Results   Component Value Date     01/23/2020    K 3.9 01/23/2020     01/23/2020    CO2 24 01/23/2020    BUN 26 01/23/2020    LABALBU 4.1 01/22/2020    CREATININE 1.08 01/23/2020    CALCIUM 8.9 01/23/2020    GFRAA >60 01/23/2020    LABGLOM >60 01/23/2020    GLUCOSE 156 01/23/2020           Physical Exam:  Vitals: BP (!) 135/56   Pulse 69   Temp 98.8 °F (37.1 °C) (Tympanic)   Resp 18   Ht 6' (1.829 m)   Wt 220 lb (99.8 kg)   SpO2 96%   BMI 29.84 kg/m²   24 hour intake/output:    Intake/Output Summary (Last 24 hours) at 1/23/2020 1404  Last data filed at 1/23/2020 0810  Gross per 24 hour   Intake 1315 ml   Output 3400 ml   Net -2085 ml     Last 3 weights: Wt Readings from Last 3 Encounters:   01/23/20 220 lb (99.8 kg)   12/18/19 200 lb (90.7 kg)   10/30/19 195 lb (88.5 kg)     HEENT: Normocephalic and Atraumatic  Neck: Supple, No Masses, Tenderness, Nodularity and No Lymphadenopathy  Chest/Lungs: Clear to Auscultation without Rales, Rhonchi, or Wheezes and Distant Breath Sounds  Cardiac: Regular Rate and Rhythm  GI/Abdomen: Bowel Sounds Present and Soft, Non-tender, without Guarding or Rebound Tenderness  : at time of examination---gayle in place--urine champagne colored---clear----no clots  EXT/Skin: No Cyanosis and No Clubbing  Neuro: alert--generalized weakness--clumbsiness---essentially \"scooter-bound\" for mobility      Assessment:    Active Problems:    Bladder tumor  Resolved Problems:    * No resolved hospital problems.  Yanira Scruggs  78 [address]  Ted Sterling, FP; Sonal Tolbert, Urology]  FULL CODE      no anticoagulation--- bleeding   continuous irrigation catheter     Anti-infectives:  Cipro IV x 1, Ancef IV    POD _____ TURBT---1.22.2020---[facility]  Bladder tumor----7 cm---1.22.2020        Cystoscopy---12.18.2019    Progressive Supranuclear Palsy---PSP--previous diagnosis of Parkinsons         Dysphagia---possible aspiration                   MBS---1.23.2020--[-]         Gait-balance instability         Weakness---scooter-bound Clumsiness--gross and fine motor          Dysarthria   Hypertension  Hypertriglyceridemia  Diabetes Mellitus Type 2  CKD---Stage 3  ASCVD         Cardiac catheterization---2.28.2018---RCA sent         Cardiac catheterization--stent x 2----2017         Cardiac catheterization---PTCA--stents---1. 1.2013---12.21.2013  Peripheral arterial disease         AAA without rupture----endovascular stent repair---2012         Carotid stenosis---asymptomatic         Claudication  Arrhythmia           Paroxysmal atrial fibrillation----history  COPD  Anemia   Depression   Tobacco abuse---quit---1. 1.2019  PMH:  clotting disorder, chronic low back pain, glaucoma, pneumonia, liver             cyst, lumbosacral spondylosis, OA, non--melanoma skin cancer,             left-sided thyroid nodule, tremor, trochanteric bursitis, umbilical hernia   PSH:   see above, PTCA--2013---site?, colonoscopy, hemorrhoidectomy,             left TKA, skin cancer excision, umbilical hernia repair    Allergies:   NKDA       Plan:  1. Home---1.23.2020  2. Home medications reviewed and no changes  3. Follow up Dr. Samantha Woods, ---tor  4. Follow Dr. Kayleigh Quan, Urology  5.   See orders    Electronically signed by Domi Matta on 1/23/2020 at 2:04 PM    Hospitalist

## 2020-01-23 NOTE — PLAN OF CARE
Problem: Falls - Risk of:  Goal: Will remain free from falls  Description  Will remain free from falls  Outcome: Ongoing  Goal: Absence of physical injury  Description  Absence of physical injury  Outcome: Ongoing     Problem: Urinary Elimination:  Goal: Signs and symptoms of infection will decrease  Description  Signs and symptoms of infection will decrease  Outcome: Ongoing  Goal: Complications related to the disease process, condition or treatment will be avoided or minimized  Description  Complications related to the disease process, condition or treatment will be avoided or minimized  Outcome: Ongoing     Problem: Risk for Impaired Skin Integrity  Goal: Tissue integrity - skin and mucous membranes  Description  Structural intactness and normal physiological function of skin and  mucous membranes.   Outcome: Ongoing

## 2020-01-24 NOTE — DISCHARGE SUMMARY
underwent a TURBT on 01/22/2020, by Dr. Kayleigh Quan, Urology. The  patient tolerating the procedure well, no known complications. The  patient able to be discharged to home 01/23/2020,        choking spell that the  patient had with a combination of liquids together with solids,  particularly corn. The patient underwent a modified barium swallow on  01/23/2020, demonstrating delayed swallowing, though with filling of the  vallecula before pharyngeal swallow, decreased pharyngeal contractions  with no actual aspiration during this testing. It is evident that the  patient must use care in terms to his eating his meals. Recommended by  Speech based on the modified barium swallow is thin liquids, regular  consistency foods. However, the patient is to upright, utilize small  bites alternating with sips of liquid. Hypertension, blood pressure 146/65. Diabetes mellitus type 2, blood  glucose level was 156, within goal range following surgery. Chronic  kidney disease typically at a stage 3 level, currently a stage 2. LABORATORY DATA:  Around the time of discharge, white cell count 4.3,  hemoglobin 9.2, hematocrit 29.7 and platelets 184,211. Sodium 140,  potassium 3.9, chloride 103, CO2 of 24, BUN 26, creatinine 1.08, glucose  156, calcium 8.9, GFR greater than 60. DISCHARGE INSTRUCTIONS/FOLLOWUP:  Discharged to home on 01/23/2020. DIET:  Thin liquid, regular consistency foods with restrictions as set  forth above. ACTIVITY:  As tolerated. CONDITION AT DISCHARGE:  Improved. MEDICATIONS:  The patient received Plavix (clopidogrel) 75 mg p.o.  daily. Tamsulosin (Flomax) 0.4 mg p.o. daily, carbidopa-levodopa  (Sinemet) 25/100 mg one p.o. 4 times daily, fenofibrate 134 mg p.o.  daily. Timoptic ophthalmic solution as directed. Hydrochlorothiazide  12.5 mg p.o. daily.   Note also the patient has been on TriCor  (fenofibrate) 145 mg p.o. daily, he should select one or the other for  [place] or

## 2020-01-24 NOTE — FLOWSHEET NOTE
Teaching provided to pt and wife regarding gayle management and care. Irrigation tubing removed from gayle, and plug inserted, bag changed to 2000ml bag. Pt wife provided return demonstration on how to empty bag. Instructed on keeping penis and groin clean and also to clean the tip of drainage bag when emptying. Pt wife reports understanding and no questions at this time.

## 2020-01-29 NOTE — PROGRESS NOTES
Yuliya Grace MD   Urology Clinic follow up      Patient:  Estefanía Ellison  YOB: 1940  Date: 1/29/2020    HISTORY OF PRESENT ILLNESS:   The patient is a 78 y.o. male who presents today for evaluation of the following problem(s): OAB, bladder tumor/cancer  Overall the problem(s) : are improving  Associated Symptoms: No dysuria, gross hematuria. Pain Severity:      Summary of old records: BPH on flomax  (Patient's old records, notes and chart reviewed and summarized above.)    Duration: 2 months  Location: bladder  alleviating factors: ditropan  Aggravating factors: none  Associated with: no hematuria, weak stream  Frequency: constant  On flomax for BPH for 2 years  Bothersome nocturia and urgency and UUI- stopped oxybutynin due to side effects, abdominal pain    Cystoscopy TURBT 1/22/2020  BLADDER TUMOR, TRANSURETHRAL RESECTION:        -    NONINVASIVE, HIGH-GRADE PAPILLARY UROTHELIAL CARCINOMA.      Last several PSA's:  Lab Results   Component Value Date    PSA 4.12 (H) 08/09/2013       Last total testosterone:  No results found for: TESTOSTERONE    Urinalysis today:  No results found for this visit on 01/29/20.       Last BUN and creatinine:  Lab Results   Component Value Date    BUN 26 (H) 01/23/2020     Lab Results   Component Value Date    CREATININE 1.08 01/23/2020       Imaging Reviewed during this Office Visit:   (results were independently reviewed by physician and radiology report verified)    PAST MEDICAL, FAMILY AND SOCIAL HISTORY:  Past Medical History:   Diagnosis Date    Abdominal aortic aneurysm ([address]     Abnormal non-fasting glucose     Balance problem     BPH (benign prostatic hypertrophy)     CAD (coronary artery disease)     Chronic low back pain     Depression     Gait difficulty     Glaucoma     Hypertension     Hypertriglyceridemia     Liver cyst     Lumbosacral spondylosis     Osteoarthritis     Parkinson disease ([address]     Thyroid nodule     Left lobe    1958    Last attempt to quit: 2019    Years since quittin.0   Smokeless Tobacco Never Used       Social History     Substance and Sexual Activity   Alcohol Use Not Currently    Alcohol/week: 0.0 standard drinks    Frequency: Never    Binge frequency: Never       REVIEW OF SYSTEMS:  Constitutional: negative  Eyes: negative  Respiratory: negative  Cardiovascular: negative  Gastrointestinal: negative  Musculoskeletal: negative  Genitourinary: negative except for what is in HPI  Skin: negative   Neurological: negative  Hematological/Lymphatic: negative  Psychological: negative    Physical Exam:    This a 78 y.o. male   Vitals:    20 1134   BP: (!) 102/58   Pulse: 72   Resp: 16   SpO2: 94%     Constitutional: Patient in no acute distress; Neuro: alert and oriented to person place and time. Assessment and Plan      1. Urge incontinence    2. Urinary frequency    3. Urgency of urination    4. BPH with obstruction/lower urinary tract symptoms    5. Malignant neoplasm of lateral wall of urinary bladder (HCC)           Plan:       OAB: Patient instructed to avoid bladder irritants in diet such as coffee, tea, caffeine, alcohol, carbonated beverages, spicy/acidic foods. Patient given a list of these to avoid. OAB: will re asses after gayle comes out now that tumor has been removed    BPH: continue flomax    Bladder cancer: Gayle removed today. HG non invasive, will repeat Cysto TURBT, General in 1 month.  Then start BCG induction 6 weeks post op             Rodrigo Dalton MD  [facility]

## 2020-03-11 NOTE — TELEPHONE ENCOUNTER
Patient present for cysto turbt, per Dr. Dennys Vivar, patient to have myrbetriq 50mg samples, samples given to patient, is to take one pill once day, for 4 weeks, lot number [id number] exp 4/30/22

## 2020-03-11 NOTE — H&P
History and Physical    Patient:  Humza Queen  MRN: [id number]  YOB: 1940    CHIEF COMPLAINT:  Bladder cancer    HISTORY OF PRESENT ILLNESS:   The patient is a 78 y.o. male who presents with   Cystoscopy TURBT 1/22/2020  BLADDER TUMOR, TRANSURETHRAL RESECTION:        -    NONINVASIVE, HIGH-GRADE PAPILLARY UROTHELIAL CARCINOMA.      Here for re resection    Patient's old records, notes and chart reviewed and summarized above. Past Medical History:    Past Medical History:   Diagnosis Date    Abdominal aortic aneurysm (HCC)     Abnormal non-fasting glucose     Balance problem     BPH (benign prostatic hypertrophy)     CAD (coronary artery disease)     Chronic low back pain     Depression     Gait difficulty     Glaucoma     Hypertension     Hypertriglyceridemia     Liver cyst     Lumbosacral spondylosis     Osteoarthritis     Parkinson disease ([address]     Thyroid nodule     Left lobe    Tremor        Past Surgical History:    Past Surgical History:   Procedure Laterality Date    ABDOMINAL AORTIC ANEURYSM REPAIR  08-    COLONOSCOPY      CYSTOSCOPY N/A 12/18/2019    CYSTO performed by Connor Reynolds MD at [address] 1/22/2020    CYSTOSCOPY TURBT performed by Connor Reynolds MD at [address] [place] [address]      left side  total replacement     PTCA  01-    SKIN CANCER EXCISION       Medications Prior to Admission:    Prior to Admission medications    Medication Sig Start Date End Date Taking?  Authorizing Provider   clopidogrel (PLAVIX) 75 MG tablet Take 1 tablet by mouth daily 1/28/20   Connor Reynolds MD   oxybutynin (DITROPAN XL) 10 MG extended release tablet Take 1 tablet by mouth daily 8/14/19   Connor Reynolds MD   tamsulosin (FLOMAX) 0.4 MG capsule tamsulosin 0.4 mg capsule 6/12/19   Historical Provider, MD   carbidopa-levodopa (SINEMET)  MG per tablet TAKE 1 TABLET BY MOUTH 4 TIMES DAILY 6/2/16   Divina Koenig Never    Drug use: No    Sexual activity: Not Currently   Lifestyle    Physical activity     Days per week: Not on file     Minutes per session: Not on file    Stress: Not on file   Relationships    Social connections     Talks on phone: Not on file     Gets together: Not on file     Attends Oriental orthodox service: Not on file     Active member of club or organization: Not on file     Attends meetings of clubs or organizations: Not on file     Relationship status: Not on file    Intimate partner violence     Fear of current or ex partner: Not on file     Emotionally abused: Not on file     Physically abused: Not on file     Forced sexual activity: Not on file   Other Topics Concern    Not on file   Social History Narrative    Not on file       Family History:    Family History   Problem Relation Age of Onset    Heart Attack Father        REVIEW OF SYSTEMS:  Constitutional: negative  Eyes: negative  Respiratory: negative  Cardiovascular: negative  Gastrointestinal: negative  Genitourinary: see HPI  Musculoskeletal: negative  Skin: negative   Neurological: negative  Hematological/Lymphatic: negative  Psychological: negative    Physical Exam:      No data found. Constitutional: Patient in no acute distress; Neuro: alert and oriented to person place and time. Psych: Mood and affect normal.  Skin: Normal  Lungs: Respiratory effort normal, CTA  Cardiovascular:  Normal peripheral pulses; no murmur  Abdomen: Soft, non-tender, non-distended with no CVA, flank pain, hepatosplenomegaly or hernia. Kidneys normal.  Bladder non-tender and not distended. LABS:   No results for input(s): WBC, HGB, HCT, MCV, PLT in the last 72 hours. No results for input(s): NA, K, CL, CO2, PHOS, BUN, CREATININE in the last 72 hours.     Invalid input(s): CA  Lab Results   Component Value Date    PSA 4.12 (H) 08/09/2013           Urinalysis: No results for input(s): COLORU, PHUR, LABCAST, WBCUA, RBCUA, MUCUS, TRICHOMONAS, YEAST, BACTERIA, CLARITYU, SPECGRAV, LEUKOCYTESUR, UROBILINOGEN, Steven Lacrosse in the last 72 hours.     Invalid input(s): NITRATE, GLUCOSEUKETONESUAMORPHOUS     -----------------------------------------------------------------      Assessment and Plan   Impression:    Patient Active Problem List   Diagnosis    AAA (abdominal aortic aneurysm) without rupture ([address]    Carotid stenosis, asymptomatic    Diabetes mellitus with peripheral artery disease ([address]    Claudication ([address]    CAD (coronary artery disease)    Thyroid nodule    Liver cyst    Hypertriglyceridemia    Abdominal aortic aneurysm (HCC)    Hypertension    Glaucoma    Lumbosacral spondylosis    Osteoarthritis    Chronic low back pain    Abnormal non-fasting glucose    Depression    Parkinson disease (HCC)    Tremor    Gait difficulty    Balance problem    Bladder tumor       Plan:     Consent obtained; cysto TURBT in OR today    Stephen Masters M.D  1:27 PM 3/11/2020

## 2020-03-11 NOTE — ANESTHESIA PRE PROCEDURE
Department of Anesthesiology  Preprocedure Note       Name:  Marec Ibanez   Age:  78 y.o.  :  1940                                          MRN:  [id number]         Date:  3/11/2020      Surgeon: Cheryl Goyal):  Kaylyn Alvarado MD    Procedure: CYSTO TURBT (N/A )    Medications prior to admission:   Prior to Admission medications    Medication Sig Start Date End Date Taking? Authorizing Provider   tamsulosin (FLOMAX) 0.4 MG capsule tamsulosin 0.4 mg capsule 19  Yes Historical Provider, MD   carbidopa-levodopa (SINEMET)  MG per tablet TAKE 1 TABLET BY MOUTH 4 TIMES DAILY 16  Yes Wolf Singer MD   fenofibrate micronized (LOFIBRA) 134 MG capsule  9/15/15  Yes Historical Provider, MD   timolol (TIMOPTIC) 0.5 % ophthalmic solution  11/20/15  Yes Historical Provider, MD   hydrochlorothiazide (HYDRODIURIL) 12.5 MG tablet Take 12.5 mg by mouth daily   Yes Historical Provider, MD   fenofibrate (TRICOR) 145 MG tablet Take 145 mg by mouth daily   Yes Historical Provider, MD   ipratropium (ATROVENT) 0.02 % nebulizer solution Take 5 mg by nebulization daily  2/18/15  Yes Historical Provider, MD   amLODIPine (NORVASC) 10 MG tablet Take 10 mg by mouth daily. Yes Historical Provider, MD   atorvastatin (LIPITOR) 40 MG tablet Take 40 mg by mouth daily. Yes Historical Provider, MD   acetaminophen (TYLENOL) 325 MG tablet Take 650 mg by mouth every 6 hours as needed. Yes Historical Provider, MD   carvedilol (COREG) 12.5 MG tablet Take 12.5 mg by mouth 2 times daily. Yes Historical Provider, MD   clopidogrel (PLAVIX) 75 MG tablet Take 1 tablet by mouth daily 20   Kaylyn Alvarado MD   oxybutynin (DITROPAN XL) 10 MG extended release tablet Take 1 tablet by mouth daily 19   Kaylyn Alvarado MD   nitroGLYCERIN (NITROSTAT) 0.4 MG SL tablet Place 0.4 mg under the tongue every 5 minutes as needed.     Historical Provider, MD       Current medications:    Current Facility-Administered Medications Medication Dose Route Frequency Provider Last Rate Last Dose    sodium chloride flush 0.9 % injection 10 mL  10 mL Intravenous 2 times per day Fabio Strickland MD        sodium chloride flush 0.9 % injection 10 mL  10 mL Intravenous PRN Fabio Strickland MD        ceFAZolin (ANCEF) 2 g in sterile water 20 mL IV syringe  2 g Intravenous On Call to [id number] [place] [address], MD        lactated ringers infusion   Intravenous Continuous Fabio Strickland  mL/hr at 03/11/20 1354         Allergies:  No Known Allergies    Problem List:    Patient Active Problem List   Diagnosis Code    AAA (abdominal aortic aneurysm) without rupture (HCC) I71.4    Carotid stenosis, asymptomatic I65.29    Diabetes mellitus with peripheral artery disease (HCC) E11.51    Claudication (HCC) I73.9    CAD (coronary artery disease) I25.10    Thyroid nodule E04.1    Liver cyst K76.89    Hypertriglyceridemia E78.1    Abdominal aortic aneurysm (HCC) I71.4    Hypertension I10    Glaucoma H40.9    Lumbosacral spondylosis M47.817    Osteoarthritis M19.90    Chronic low back pain M54.5, G89.29    Abnormal non-fasting glucose R73.09    Depression F32.9    Parkinson disease ([address] G20    Tremor R25.1    Gait difficulty R26.9    Balance problem R26.89    Bladder tumor D49.4       Past Medical History:        Diagnosis Date    Abdominal aortic aneurysm (HCC)     Abnormal non-fasting glucose     Balance problem     BPH (benign prostatic hypertrophy)     CAD (coronary artery disease)     Chronic low back pain     Depression     Gait difficulty     Glaucoma     Hypertension     Hypertriglyceridemia     Liver cyst     Lumbosacral spondylosis     Osteoarthritis     Parkinson disease ([address]     Thyroid nodule     Left lobe    Tremor        Past Surgical History:        Procedure Laterality Date    ABDOMINAL AORTIC ANEURYSM REPAIR  08-    COLONOSCOPY      CYSTOSCOPY N/A 12/18/2019    CYSTO performed by Fabio Strickland, 01/23/2020    GLUCOSE 156 01/23/2020    PROT 7.7 01/22/2020    CALCIUM 8.9 01/23/2020    BILITOT 0.59 01/22/2020    ALKPHOS 96 01/22/2020    AST 19 01/22/2020    ALT 13 01/22/2020       POC Tests: No results for input(s): POCGLU, POCNA, POCK, POCCL, POCBUN, POCHEMO, POCHCT in the last 72 hours. Coags:   Lab Results   Component Value Date    PROTIME 11.4 08/15/2012    PROTIME 12.7 08/15/2012    INR 1.1 08/15/2012    APTT 27.6 08/15/2012       HCG (If Applicable): No results found for: PREGTESTUR, PREGSERUM, HCG, HCGQUANT     ABGs: No results found for: PHART, PO2ART, ZIZ9OTJ, CPI8IMY, BEART, F4KETXJU     Type & Screen (If Applicable):  No results found for: [facility]    Anesthesia Evaluation  Patient summary reviewed no history of anesthetic complications:   Airway: Mallampati: II  TM distance: >3 FB   Neck ROM: full  Mouth opening: > = 3 FB Dental:    (+) partials      Pulmonary:normal exam                               Cardiovascular:  Exercise tolerance: good (>4 METS),   (+) hypertension:, CAD: obstructive and no interval change, CABG/stent: no interval change, dysrhythmias: atrial fibrillation,                   Neuro/Psych:   (+) psychiatric history:depression/anxiety              ROS comment: [facility] GI/Hepatic/Renal:             Endo/Other:    (+) Diabetes, . Abdominal:           Vascular:   + PVD, aortic or cerebral, . ROS comment: AAA, CAROTID STENOSIS. Anesthesia Plan      general     ASA 3       Induction: intravenous. MIPS: Postoperative opioids intended and Prophylactic antiemetics administered. Anesthetic plan and risks discussed with patient and spouse.       Plan discussed with surgical team.                  Andrew Cheng, JAZMÍN - CRNA   3/11/2020

## 2020-03-18 NOTE — PROGRESS NOTES
has no known allergies. Social History     Tobacco Use   Smoking Status Former Smoker    Packs/day: 0.30    Years: 60.00    Pack years: 18.00    Types: Cigarettes    Start date: 1958    Last attempt to quit: 2019    Years since quittin.2   Smokeless Tobacco Never Used       Social History     Substance and Sexual Activity   Alcohol Use Not Currently    Alcohol/week: 0.0 standard drinks    Frequency: Never    Binge frequency: Never       REVIEW OF SYSTEMS:  Constitutional: negative  Eyes: negative  Respiratory: negative  Cardiovascular: negative  Gastrointestinal: negative  Musculoskeletal: negative  Genitourinary: negative except for what is in HPI  Skin: negative   Neurological: negative  Hematological/Lymphatic: negative  Psychological: negative    Physical Exam:    This a 78 y.o. male   Vitals:    20 0936   BP: 115/60   Pulse: 60   Temp: 96.9 °F (36.1 °C)     Constitutional: Patient in no acute distress; Neuro: alert and oriented to person place and time. Assessment and Plan      1. Malignant neoplasm of lateral wall of urinary bladder (HCC)    2. Urge incontinence    3. Urinary frequency    4. Urgency of urination    5. BPH with obstruction/lower urinary tract symptoms           Plan:       OAB: Patient instructed to avoid bladder irritants in diet such as coffee, tea, caffeine, alcohol, carbonated beverages, spicy/acidic foods. Patient given a list of these to avoid.   OAB: will try Ditropan BID & Myrbetriq QD    BPH: continue flomax    Bladder cancer: re resection shows no cancer in the specimen  Will start BCG induction in 6-8 weeks             MD Larry [facility]Lo[facility]

## 2020-03-20 NOTE — TELEPHONE ENCOUNTER
Wash Delay from [facility] called and stated that patient is having an upset stomach and also does not like know Oxybutynin makes him feel. Can he try anything else.

## 2020-03-24 NOTE — TELEPHONE ENCOUNTER
Jorje Perdue returned call, let her know I talked with the patient and will have him take the myrbetriq but stop the oxybutin

## 2020-04-21 NOTE — TELEPHONE ENCOUNTER
Patient is scheduled for a nurse visit on 5/6/2020 to have a medication administered into his bladder. Wife would like to know if this is still going to happen or be rescheduled. Please call her back at [phone].

## 2020-05-06 NOTE — PATIENT INSTRUCTIONS
BCG    What is this drug used for?  It is used to treat bladder cancer. What do I need to tell my doctor BEFORE I take this drug?  If you have an allergy to BCG or any other part of this drug.  If you are allergic to any drugs like this one, any other drugs, foods, or other substances. Tell your doctor about the allergy and what signs you had, like rash; hives; itching; shortness of breath; wheezing; cough; swelling of face, lips, tongue, or throat; or any other signs.  If you have any of these health problems: Active TB (tuberculosis), blood in the urine, urinary tract infection (UTI), any infection, an illness with a fever, a weak immune system, or a disease that may cause a weak immune system like HIV.  If you have or have ever had a BCG reaction.  If you are getting other treatments like radiotherapy or chemo.  If you are taking any drugs that suppress your immune system. There are many drugs that can do this. Ask your doctor or pharmacist if you are not sure.  If you have had any of these in the past 14 days: A biopsy, a procedure called transurethral resection (TUR), or damage to the urinary tract after a catheter has been placed.  If you are breast-feeding or plan to breast-feed. This is not a list of all drugs or health problems that interact with this drug. Tell your doctor and pharmacist about all of your drugs (prescription or OTC, natural products, vitamins) and health problems. You must check to make sure that it is safe for you to take this drug with all of your drugs and health problems. Do not start, stop, or change the dose of any drug without checking with your doctor. What are some things I need to know or do while I take this drug?  Tell all of your health care providers that you take this drug. This includes your doctors, nurses, pharmacists, and dentists.  If you have a latex allergy, talk with your doctor.     Take extra care with your urine for the  Blood in the urine.  High fever over 103.1 degrees F or 39.5 degrees C for more than 12 hours.  Mild fever over 101.3 degrees F or 38.5 degrees C for more than 48 hours.  Very bad dizziness or passing out.  Chest pain.  Joint pain.  Eye pain.  Eye irritation.  Eye redness.  Cough.  Throwing up.  Feeling very tired or weak. What are some other side effects of this drug? All drugs may cause side effects. However, many people have no side effects or only have minor side effects. Call your doctor or get medical help if any of these side effects or any other side effects bother you or do not go away:   Upset stomach.  Not hungry.  Many people using this drug have bladder irritation. It may start within a few hours after getting this drug and may last for 1 to 3 days. You may pass urine more often or feel the need to pass urine right away. Call your doctor if you have bladder irritation that bothers you or does not go away within 3 days.  It is common to have burning or pain when passing urine, chills, flu-like signs, mild fever, tiredness, or weakness. If any of these signs last more than 2 days or get worse, call your doctor. These are not all of the side effects that may occur. If you have questions about side effects, call your doctor. Call your doctor for medical advice about side effects. You may report side effects to your national health agency. How is this drug best taken? Use this drug as ordered by your doctor. Read all information given to you. Follow all instructions closely.  It is given through a catheter into the bladder.  You will need to try to keep this drug in your bladder for up to 2 hours, but no longer than 2 hours. This drug will come out when you pass urine.  Drink plenty of liquids that do not have caffeine for several hours after getting this drug unless told to drink less liquids by your doctor.  This helps to get rid of the drug from your bladder. What do I do if I miss a dose?  Call your doctor to find out what to do. How do I store and/or throw out this drug?  If you need to store this drug at home, talk with your doctor, nurse, or pharmacist about how to store it. General drug facts   If your symptoms or health problems do not get better or if they become worse, call your doctor.  Do not share your drugs with others and do not take anyone else's drugs.  Keep a list of all your drugs (prescription, natural products, vitamins, OTC) with you. Give this list to your doctor.  Talk with the doctor before starting any new drug, including prescription or OTC, natural products, or vitamins.  Keep all drugs in a safe place. Keep all drugs out of the reach of children and pets.  Throw away unused or  drugs. Do not flush down a toilet or pour down a drain unless you are told to do so. Check with your pharmacist if you have questions about the best way to throw out drugs. There may be drug take-back programs in your area.  Some drugs may have another patient information leaflet. If you have any questions about this drug, please talk with your doctor, nurse, pharmacist, or other health care provider.  If you think there has been an overdose, call your poison control center or get medical care right away. Be ready to tell or show what was taken, how much, and when it happened.

## 2020-05-07 NOTE — TELEPHONE ENCOUNTER
Called patient to check on him. He stated that the bleeding has stopped and urine is clearing up. He denied any fevers or chills. He stated that his has had significant improvement since this morning. Advised him to call if bleeding returned or if he any problems or questions. Patient understood.

## 2020-05-08 NOTE — TELEPHONE ENCOUNTER
Patient's wife called and is very concerned about patient. He is very weak. He can not stand and move around since the BCG on Wednesday. Wife stated that she had to call for help last night. Wife would like to know if this is normal. Please call her back.

## 2020-05-08 NOTE — TELEPHONE ENCOUNTER
Contacted patient, let him know it is normal to have the weakness, will have patient call on Tuesday before next bcg treatment, if patient is still weak will postpone to the following week.

## 2020-05-20 NOTE — TELEPHONE ENCOUNTER
Contacted patient, per Dr. Brandy Rdz, is okay to stop the BCG and do a cysto in 3 months, is scheduled 8/26/20 at 840am. Patient verbalized understanding

## 2020-06-15 NOTE — TELEPHONE ENCOUNTER
Contacted Laurent Mora, let her know, that we will call Dr. Francisco Aguirre office to see if they need clearance and go from there.

## 2020-06-15 NOTE — TELEPHONE ENCOUNTER
6/15/2020 Patient's wife, Ines, called this morning inquiring about her  being cleared by Dr Joby Moreira to have hip replacement by Dr Ilda Moe, Orthopedic surgeon at [facility],  [phone]. Caller states \"Ally\" at Dr Randy Chaney office told her she has tried contacting us \"10\" times and has not heard back from Dr Compa rodríguez. Patient's wife requesting Dr Compa rodríguez to call Dr Randy Chaney office.

## 2020-07-01 NOTE — TELEPHONE ENCOUNTER
Patient is scheduled for a cysto on 8/26/2020 at 8:40 with Dr. Carlos Wild. Patient would like a later morning appointment if possible. Please call patient back at [phone].

## 2020-08-04 NOTE — TELEPHONE ENCOUNTER
Patient is scheduled for a cysto 8/5/2020 with Dr. Sarah Celaya. Patient's wife would like to discuss because he is weak because of his hemoglobin and is currently in the nursing home. Please call her back at [phone].

## 2020-08-05 NOTE — TELEPHONE ENCOUNTER
Prescription for tamsulosin 0.4 mg was sent to [facility]. There are no instructions. Please call Williams Vail back at [phone].

## 2020-08-05 NOTE — PROGRESS NOTES
HISTORY OF PRESENT ILLNESS:   The patient is a 78 y.o. male who presents today for evaluation of the following problem(s): OAB, bladder tumor/cancer  Overall the problem(s) : are improving  Associated Symptoms: No dysuria, gross hematuria. Pain Severity:      Summary of old records: BPH on flomax  (Patient's old records, notes and chart reviewed and summarized above.)    Duration: 2 months  Location: bladder  alleviating factors: ditropan  Aggravating factors: none  Associated with: no hematuria, weak stream  Frequency: constant  On flomax for BPH for 2 years  Bothersome nocturia and urgency and UUI- stopped oxybutynin due to side effects, abdominal pain    Cystoscopy TURBT 1/22/2020  BLADDER TUMOR, TRANSURETHRAL RESECTION:        -    NONINVASIVE, HIGH-GRADE PAPILLARY UROTHELIAL CARCINOMA. reresection 3/11- no cancer    Only had one BCG made him sick    Cystoscopy Operative Note    Findings:   The patient was prepped and draped in the usual sterile fashion. The flexible cystoscope was advanced through the urethra and into the bladder. The bladder was thoroughly inspected and the following was noted:    Urethra: No abnormalities of the urethra are noted. Prostate: Complete obstruction by lateral & median lobe of prostate. Bladder: left lateral wall debris and scarring but no obvious tumors. No bladder diverticulum. Severe trabeculation noted. The cystoscope was removed. The patient tolerated the procedure well.       OAB: will try Ditropan BID & Myrbetriq QD    BPH: continue flomax    Bladder cancer: cystoscopy 3 months

## 2020-08-19 NOTE — PROGRESS NOTES
[facility] General Surgery   History & Physical  Ajit DO Fabienne  Pt Name: Lavena Bernheim  MRN: [id number]  [place]: 1940  Date of evaluation: 8/19/2020  Primary Care Physician: Nathalie Diehl    Chief Complaint:   Chief Complaint   Patient presents with   Judd Borrow Anemia     8.1         SUBJECTIVE:    History of Present Illness: This is a 78 y.o.  male who presents for evaluation for endoscopic workup for anemia, no prior colonoscopy, denies heartburn or history of bloody stool but his wife reports that he has had dark stools recently. Significant comorbidities including CAD and COPD. Nonsmoker, rare coffee. Pt demonstrates some mildly labored breathing in the clinic, reports that he uses an inhaler during these episodes. Hgb has been stable between 8-9 since 5/2019        Past Medical History   has a past medical history of Abdominal aortic aneurysm ([address] Abnormal non-fasting glucose, Balance problem, BPH (benign prostatic hypertrophy), CAD (coronary artery disease), Chronic low back pain, Depression, Gait difficulty, Glaucoma, Hypertension, Hypertriglyceridemia, Liver cyst, Lumbosacral spondylosis, Osteoarthritis, Parkinson disease ([address] Thyroid nodule, and Tremor. Past Surgical History   has a past surgical history that includes Colonoscopy; Skin cancer excision; Hemorrhoid surgery; Abdominal aortic aneurysm repair (08-); Percutaneous Transluminal Coronary Angio (01-); knee surgery; Cystoscopy (N/A, 12/18/2019); Cystoscopy (N/A, 1/22/2020); and Cystoscopy (N/A, 3/11/2020). Family History  family history includes Heart Attack in his father. Social History  Tobacco use:  reports that he quit smoking about 19 months ago. His smoking use included cigarettes. He started smoking about 62 years ago. He has a 18.00 pack-year smoking history. He has never used smokeless tobacco.  Alcohol use:  reports previous alcohol use.   Drug use:  reports no history of drug use.      Medications  Current Medications:   Current Outpatient Medications   Medication Sig Dispense Refill    tamsulosin (FLOMAX) 0.4 MG capsule tamsulosin 0.4 mg capsule 90 capsule 1    oxybutynin (DITROPAN XL) 10 MG extended release tablet Take 1 tablet by mouth 2 times daily 180 tablet 1    clopidogrel (PLAVIX) 75 MG tablet Take by mouth      carbidopa-levodopa (SINEMET)  MG per tablet TAKE 1 TABLET BY MOUTH 4 TIMES DAILY 360 tablet 1    timolol (TIMOPTIC) 0.5 % ophthalmic solution       hydrochlorothiazide (HYDRODIURIL) 12.5 MG tablet Take 12.5 mg by mouth daily      fenofibrate (TRICOR) 145 MG tablet Take 145 mg by mouth daily      ipratropium (ATROVENT) 0.02 % nebulizer solution Take 5 mg by nebulization daily       amLODIPine (NORVASC) 10 MG tablet Take 10 mg by mouth daily.  atorvastatin (LIPITOR) 40 MG tablet Take 40 mg by mouth daily.  acetaminophen (TYLENOL) 325 MG tablet Take 650 mg by mouth every 6 hours as needed.  carvedilol (COREG) 12.5 MG tablet Take 12.5 mg by mouth 2 times daily.  mirabegron (MYRBETRIQ) 25 MG TB24 Take 25 mg by mouth daily      fenofibrate micronized (LOFIBRA) 134 MG capsule       nitroGLYCERIN (NITROSTAT) 0.4 MG SL tablet Place 0.4 mg under the tongue every 5 minutes as needed. No current facility-administered medications for this visit. Home Medications:   Prior to Admission medications    Medication Sig Start Date End Date Taking?  Authorizing Provider   tamsulosin (FLOMAX) 0.4 MG capsule tamsulosin 0.4 mg capsule 8/5/20  Yes Cortez Dukes MD   oxybutynin (DITROPAN XL) 10 MG extended release tablet Take 1 tablet by mouth 2 times daily 8/5/20 11/3/20 Yes Cortez Dukes MD   clopidogrel (PLAVIX) 75 MG tablet Take by mouth   Yes Historical Provider, MD   carbidopa-levodopa (SINEMET)  MG per tablet TAKE 1 TABLET BY MOUTH 4 TIMES DAILY 6/2/16  Yes Berta Mao MD   timolol (TIMOPTIC) 0.5 % ophthalmic solution 11/20/15  Yes Historical Provider, MD   hydrochlorothiazide (HYDRODIURIL) 12.5 MG tablet Take 12.5 mg by mouth daily   Yes Historical Provider, MD   fenofibrate (TRICOR) 145 MG tablet Take 145 mg by mouth daily   Yes Historical Provider, MD   ipratropium (ATROVENT) 0.02 % nebulizer solution Take 5 mg by nebulization daily  2/18/15  Yes Historical Provider, MD   amLODIPine (NORVASC) 10 MG tablet Take 10 mg by mouth daily. Yes Historical Provider, MD   atorvastatin (LIPITOR) 40 MG tablet Take 40 mg by mouth daily. Yes Historical Provider, MD   acetaminophen (TYLENOL) 325 MG tablet Take 650 mg by mouth every 6 hours as needed. Yes Historical Provider, MD   carvedilol (COREG) 12.5 MG tablet Take 12.5 mg by mouth 2 times daily. Yes Historical Provider, MD   mirabegron (MYRBETRIQ) 25 MG TB24 Take 25 mg by mouth daily    Historical Provider, MD   fenofibrate micronized (LOFIBRA) 134 MG capsule  9/15/15   Historical Provider, MD   nitroGLYCERIN (NITROSTAT) 0.4 MG SL tablet Place 0.4 mg under the tongue every 5 minutes as needed. Historical Provider, MD       Allergies  Patient has no known allergies. Review of Systems:  General: Denies any fever, chills. Eyes: Denies any changes in vision, diplopia or eye pain  Ears, Nose, Mouth: Denies changes in hearing/tinnitus or drainage from ears, no rhinorrhea or bloody nose, no difficulty chewing  Throat: no difficulty swallowing, no throat pain  Respiratory: Denies any shortness of breath or cough. Cardiac: Denies any chest pain, palpitations, claudication or edema. Gastrointestinal: recent history of dark stools  Genitourinary: Denies any frequency, urgency, hesitancy or incontinence. Musculoskeletal: Denies worsening muscle weakness or recent trauma  Skin: Denies rashes or lesions  Psychiatric: Denies any recent changes in mood or affect  Hematologic: Denies bruising or bleeding easily.     PHYSICAL EXAMINATION  Vitals:   Vitals:    08/19/20 1527   BP: (!) 116/52   Pulse:    Temp:        General Appearance:  awake, alert, well developed, well nourished. Mild tachypnea without accessory muscle use. Skin:  Skin color, texture, turgor normal. No rashes or lesions. Head/face:  NCAT, face symmetrical  Eyes:  PERRL, no evidence of conjunctivitis or ptosis bilaterally  Ears:  External ears and canals grossly normal, no evidence of otorrhea. Nose/Sinuses:  Nares normal. Septum midline. Mucosa normal. No external drainage noted. Mouth/Neck:  Mucosa moist.  No external oral lesions. Trachea midline. No visible masses. Lungs:  Normal chest expansion, unlabored breathing without accessory muscle use. No audible rales, rhonchi, or wheezing. Cardiovascular: S1S2. No evidence of JVD. No evidence of pulsatile masses in abdomen  Abdomen:  Soft, non-tender, no organomegaly, no masses. Musculoskeletal: No evidence of bony/muscular deformities, trauma, atrophy of either left/right upper/lower extremity. No evidence of digital clubbing or cyanosis. Neurologic:  CN 2-12 grossly intact without obvious deficits. Grossly normal sensation in all extremities. Psychiatric: appropriate judgement and insight, appropriate recall of recent and remote memory, no evidence of depression/anxiety/agitation        DIAGNOSES:   Diagnosis Orders   1. Anemia, unspecified type  Joann [place] MD, Gastroenterology, [place]     PLAN:  · Discussed cscope/EGD, pt and wife would rather have this done at a larger center given his comorbid conditions. Will refer to GI service at [facility] for evaluation and treatment. · Will have pt use inhaler in clinic and monitor response, if continued tachypnea then will send to ED for evaluation and treatment.       Medical Decision Making: low complexity     Electronically signed by Claire Olea DO on 8/19/2020 at 3:52 PM

## 2020-08-19 NOTE — ED PROVIDER NOTES
[id number] [facility]  eMERGENCY dEPARTMENT eNCOUnter      Pt Name: Chacho Colorado  MRN: [id number]  [place] 1940  Date of evaluation: 8/19/2020      CHIEF COMPLAINT       Chief Complaint   Patient presents with    Shortness of Breath         HISTORY OF PRESENT ILLNESS    Chacho Colorado is a 78 y.o. male who presents with chief complaint of shortness of breath he has a history of COPD and he was feeling fine this morning he went to Dr. Mena Michele office general surgery for consultation regarding colonoscopy he has had a recent loss in hemoglobin and had to be transfused a couple weeks ago last hemoglobin he says was 8.1. While he was in the office he became very short of breath he uses inhaler they brought him down here. On arrival here he says he feels just about back to normal he thinks that having to exert himself and wearing the mask extra contributed to this. There is been no fevers no chills no chest pain he says he always wheezes      REVIEW OF SYSTEMS       Review of Systems   Constitutional: Negative for chills and fever. HENT: Negative for congestion, dental problem, sore throat and trouble swallowing. Eyes: Negative for visual disturbance. Respiratory: Positive for choking, shortness of breath and wheezing. Cardiovascular: Negative for chest pain, palpitations and leg swelling. Gastrointestinal: Negative for abdominal pain, diarrhea, nausea and vomiting. Genitourinary: Negative for difficulty urinating, dysuria and testicular pain. Musculoskeletal: Negative for back pain, joint swelling and neck pain. Skin: Negative for rash. Neurological: Negative for dizziness, syncope, weakness and headaches. Hematological: Negative for adenopathy. Does not bruise/bleed easily. Psychiatric/Behavioral: Negative for confusion and suicidal ideas.           PAST MEDICAL HISTORY    has a past medical history of Abdominal aortic aneurysm ([facility] [address] Abnormal non-fasting glucose, Balance problem, BPH (benign prostatic hypertrophy), CAD (coronary artery disease), Chronic low back pain, Depression, Gait difficulty, Glaucoma, Hypertension, Hypertriglyceridemia, Liver cyst, Lumbosacral spondylosis, Osteoarthritis, Parkinson disease ([facility] [address] Thyroid nodule, and Tremor. SURGICAL HISTORY      has a past surgical history that includes Colonoscopy; Skin cancer excision; Hemorrhoid surgery; Abdominal aortic aneurysm repair (08-); Percutaneous Transluminal Coronary Angio (2013); knee surgery; Cystoscopy (N/A, 2019); Cystoscopy (N/A, 2020); and Cystoscopy (N/A, 3/11/2020). CURRENT MEDICATIONS       Previous Medications    ACETAMINOPHEN (TYLENOL) 325 MG TABLET    Take 650 mg by mouth every 6 hours as needed. AMLODIPINE (NORVASC) 10 MG TABLET    Take 10 mg by mouth daily. ATORVASTATIN (LIPITOR) 40 MG TABLET    Take 40 mg by mouth daily. CARBIDOPA-LEVODOPA (SINEMET)  MG PER TABLET    TAKE 1 TABLET BY MOUTH 4 TIMES DAILY    CARVEDILOL (COREG) 12.5 MG TABLET    Take 12.5 mg by mouth 2 times daily. CLOPIDOGREL (PLAVIX) 75 MG TABLET    Take by mouth    FENOFIBRATE (TRICOR) 145 MG TABLET    Take 145 mg by mouth daily    FENOFIBRATE MICRONIZED (LOFIBRA) 134 MG CAPSULE        HYDROCHLOROTHIAZIDE (HYDRODIURIL) 12.5 MG TABLET    Take 12.5 mg by mouth daily    IPRATROPIUM (ATROVENT) 0.02 % NEBULIZER SOLUTION    Take 5 mg by nebulization daily     MIRABEGRON (MYRBETRIQ) 25 MG TB24    Take 1 tablet by mouth daily    NITROGLYCERIN (NITROSTAT) 0.4 MG SL TABLET    Place 0.4 mg under the tongue every 5 minutes as needed. OXYBUTYNIN (DITROPAN XL) 10 MG EXTENDED RELEASE TABLET    Take 1 tablet by mouth 2 times daily    TAMSULOSIN (FLOMAX) 0.4 MG CAPSULE    tamsulosin 0.4 mg capsule    TIMOLOL (TIMOPTIC) 0.5 % OPHTHALMIC SOLUTION           ALLERGIES     has No Known Allergies. FAMILY HISTORY     He indicated that his mother is . He indicated that his father is .      family history includes Heart Attack in his father. SOCIAL HISTORY      reports that he quit smoking about 19 months ago. His smoking use included cigarettes. He started smoking about 62 years ago. He has a 18.00 pack-year smoking history. He has never used smokeless tobacco. He reports previous alcohol use. He reports that he does not use drugs. PHYSICAL EXAM     INITIAL VITALS:  height is 6' (1.829 m) and weight is 202 lb (91.6 kg). His tympanic temperature is 98.8 °F (37.1 °C). His blood pressure is 134/59 (abnormal) and his pulse is 68. His respiration is 25 and oxygen saturation is 97%. Physical Exam  Constitutional:       Appearance: He is well-developed. HENT:      Head: Normocephalic and atraumatic. Right Ear: External ear normal.      Left Ear: External ear normal.   Eyes:      Pupils: Pupils are equal, round, and reactive to light. Neck:      Musculoskeletal: Normal range of motion and neck supple. Cardiovascular:      Rate and Rhythm: Normal rate and regular rhythm. Pulmonary:      Effort: Pulmonary effort is normal.      Breath sounds: Examination of the right-middle field reveals wheezing and rhonchi. Examination of the left-middle field reveals wheezing and rhonchi. Examination of the right-lower field reveals decreased breath sounds, wheezing and rhonchi. Examination of the left-lower field reveals decreased breath sounds, wheezing and rhonchi. Decreased breath sounds, wheezing and rhonchi present. Chest:      Chest wall: No mass or tenderness. Abdominal:      General: Bowel sounds are normal.      Palpations: Abdomen is soft. Musculoskeletal: Normal range of motion. Right lower leg: He exhibits no tenderness. No edema. Left lower leg: He exhibits no tenderness. Skin:     General: Skin is warm and dry. Neurological:      General: No focal deficit present. Mental Status: He is alert and oriented to person, place, and time.    Psychiatric:         Behavior: Behavior normal.           DIFFERENTIAL DIAGNOSIS/ MDM:     Shortness of breath, history of recent transfusions history of COPD we will do a work-up    DIAGNOSTIC RESULTS     EKG: All EKG's are interpreted by the Emergency Department Physician who either signs or Co-signs this chart in the absence of a cardiologist.  EKG  Normal sinus rhythm rate of 61 bpm OH interval is 160 ms QRS durations 84 ms QT corrected 428 ms axis is 47 there is no acute ST or T wave changes      RADIOLOGY:   I directly visualized the following  images and reviewed the radiologist interpretations:       EXAMINATION:    ONE XRAY VIEW OF THE CHEST         8/19/2020 4:48 pm         COMPARISON:    08/16/2012         HISTORY:    ORDERING SYSTEM PROVIDED HISTORY: shortness of breath    TECHNOLOGIST PROVIDED HISTORY:    shortness of breath    Reason for Exam: Shortness of breath    Acuity: Acute    Type of Exam: Initial         FINDINGS:    Cardiomediastinal silhouette and pulmonary vasculature within normal limits.     There is hazy density in the medial right base.  No dense airspace    consolidation.  No pneumothorax or pleural effusion.  No free air beneath the    diaphragm.  No evidence of acute osseous abnormality.              Impression    Mild hazy density in the medial right lung base, possibly artifactual versus    related to mild atelectasis or developing infiltrate.  Consider short-term    follow-up with PA and lateral views of the chest.  No other acute findings in    the chest.                      ED BEDSIDE ULTRASOUND:       LABS:  Labs Reviewed   CBC WITH AUTO DIFFERENTIAL - Abnormal; Notable for the following components:       Result Value    WBC 2.7 (*)     RBC 2.79 (*)     Hemoglobin 7.5 (*)     Hematocrit 25.8 (*)     RDW 17.2 (*)     Monocytes 14 (*)     Immature Granulocytes 2 (*)     Segs Absolute 1.30 (*)     Absolute Lymph # 0.95 (*)     All other components within normal limits   BASIC METABOLIC PANEL - Abnormal; Notable for the following components:    Glucose 131 (*)     BUN 31 (*)     Bun/Cre Ratio 26 (*)     GFR Non- 58 (*)     All other components within normal limits   PROTIME-INR - Abnormal; Notable for the following components:    Protime 14.7 (*)     All other components within normal limits   TROPONIN - Abnormal; Notable for the following components:    Troponin, High Sensitivity 31 (*)     All other components within normal limits   TROPONIN - Abnormal; Notable for the following components:    Troponin, High Sensitivity 28 (*)     All other components within normal limits           EMERGENCY DEPARTMENT COURSE:   Vitals:    Vitals:    08/19/20 1606 08/19/20 1719 08/19/20 1810 08/19/20 1840   BP: (!) 111/56 130/61 139/63 (!) 134/59   Pulse: 81 68 71 68   Resp: 18 20 22 25   Temp: 98.8 °F (37.1 °C)      TempSrc: Tympanic      SpO2: 96% 97% 96% 97%   Weight: 202 lb (91.6 kg)      Height: 6' (1.829 m)        -------------------------  BP: (!) 134/59, Temp: 98.8 °F (37.1 °C), Pulse: 68, Resp: 25    Repeat EKG normal sinus rhythm rate of 68 bpm SD interval is 162 ms QS durations 88 ms QT corrected 423 ms axis is 40 there is no acute ST or T wave changes seen    Re-evaluation Notes    Reevaluation patient says he feels fine his vitals were okay his repeat EKG is normal he does not want to stay I felt that he should probably stay for observation overnight repeat hemoglobin and to follow his cardiac enzymes he refuses he says he has had this before he will follow-up with his doctors and return for any problems    CRITICAL CARE:   None        CONSULTS:      PROCEDURES:  None    FINAL IMPRESSION      1. Dyspnea, unspecified type    2.  Anemia, unspecified type          DISPOSITION/PLAN   DISPOSITION discharged    Condition on Disposition    Stable    PATIENT REFERRED TO:  Aniket Lazar  [id number]  [phone]    In 3 days        DISCHARGE MEDICATIONS:  New Prescriptions    No medications

## 2020-08-19 NOTE — ED TRIAGE NOTES
Pt came to the ED by wheelchair from Dr. Kay Cassette office. Pt was at his appt when he had to put on a mask and started complaining of SOB.

## 2020-08-20 NOTE — TELEPHONE ENCOUNTER
Spoke with Cassie Yee patients wife. Dr. Carina Trinh was able to get her in tomorrow at 10 am in his [place] office. Wife states patient has other appointment and can't make it up. Dr. Edu Dos Santos next available is 8/31/2020 in [place]. Wife will take that appt on 8/31/2020 but would like writer to call and see if one of his partners could see him sooner. Wife notified to take patient to the ER if he feels poorly. She verbalized understanding and had no further questions.

## 2020-08-20 NOTE — TELEPHONE ENCOUNTER
Spoke with patient's wife and she said he was referred to just have a colonoscopy done. Informed her that she would need to contact her PCP and have them change the referral to just a colonoscopy screening. PCP placed referral to our office with the diagnosis of anemia attached; our office policy is that the patient needs a consult with our doctors first. Patient's wife will call PCP and speak to them.

## 2020-08-21 NOTE — TELEPHONE ENCOUNTER
[place] from Dr Scarlet Stout office called and Dr Claudean December would like patient in sooner than 8/31/2020 . So I rescheduled patient with Dr Kate Chauhan per request from PCP and patient.  Thank You

## 2020-08-21 NOTE — TELEPHONE ENCOUNTER
Patient schedule for appointment with Dr. Krish Pike on 8/25/2020 per patient and wife requests for sooner appointment.

## 2020-08-25 NOTE — PROGRESS NOTES
Chronic low back pain     Depression     Gait difficulty     Glaucoma     Hypertension     Hypertriglyceridemia     Liver cyst     Lumbosacral spondylosis     Osteoarthritis     Parkinson disease ([address]     Thyroid nodule     Left lobe    Tremor        Past Surgical History:   Procedure Laterality Date    ABDOMINAL AORTIC ANEURYSM REPAIR  08-    COLONOSCOPY      CYSTOSCOPY N/A 12/18/2019    CYSTO performed by Jimmy Crespo MD at [facility] N/A 1/22/2020    CYSTOSCOPY TURBT performed by Jimmy Crespo MD at [facility] N/A 3/11/2020    CYSTO TURBT performed by Jimmy Crespo MD at [address] [place] [address]      left side  total replacement     PTCA  01-    SKIN CANCER EXCISION         CURRENT MEDICATIONS:    Current Outpatient Medications:     mirabegron (MYRBETRIQ) 25 MG TB24, Take 1 tablet by mouth daily, Disp: 30 tablet, Rfl: 11    tamsulosin (FLOMAX) 0.4 MG capsule, tamsulosin 0.4 mg capsule, Disp: 90 capsule, Rfl: 1    oxybutynin (DITROPAN XL) 10 MG extended release tablet, Take 1 tablet by mouth 2 times daily, Disp: 180 tablet, Rfl: 1    clopidogrel (PLAVIX) 75 MG tablet, Take by mouth, Disp: , Rfl:     carbidopa-levodopa (SINEMET)  MG per tablet, TAKE 1 TABLET BY MOUTH 4 TIMES DAILY, Disp: 360 tablet, Rfl: 1    fenofibrate micronized (LOFIBRA) 134 MG capsule, , Disp: , Rfl:     timolol (TIMOPTIC) 0.5 % ophthalmic solution, , Disp: , Rfl:     hydrochlorothiazide (HYDRODIURIL) 12.5 MG tablet, Take 12.5 mg by mouth daily, Disp: , Rfl:     fenofibrate (TRICOR) 145 MG tablet, Take 145 mg by mouth daily, Disp: , Rfl:     ipratropium (ATROVENT) 0.02 % nebulizer solution, Take 5 mg by nebulization daily , Disp: , Rfl:     amLODIPine (NORVASC) 10 MG tablet, Take 10 mg by mouth daily. , Disp: , Rfl:     atorvastatin (LIPITOR) 40 MG tablet, Take 40 mg by mouth daily. , Disp: , Rfl:     nitroGLYCERIN (NITROSTAT) 0.4 MG SL Topics Concern    Not on file   Social History Narrative    Not on file         REVIEW OF SYSTEMS: A 12-point review of systems was obtained and pertinent positives and negatives were listed below. REVIEW OF SYSTEMS:     Constitutional: No fever, no chills, no lethargy, no weakness. HEENT:  No headache, otalgia, itchy eyes, nasal discharge or sore throat. Cardiac:  No chest pain, dyspnea, orthopnea or PND. Chest:   No cough, phlegm or wheezing. Abdomen:      Detailed by MA   Neuro:  No focal weakness, abnormal movements or seizure like activity. Skin:   No rashes, no itching. :   No hematuria, no pyuria, no dysuria, no flank pain. Extremities:  No swelling or joint pains. ROS was otherwise negative    Review of Systems    PHYSICAL EXAMINATION: Vital signs reviewed per the nursing documentation. Temp 97.3 °F (36.3 °C)   Resp 18   Wt 202 lb (91.6 kg)   BMI 27.40 kg/m²   Body mass index is 27.4 kg/m². Physical Exam    Physical Exam   Constitutional: Patient is oriented to person, place, and time. Patient appears well-developed and well-nourished. HENT:   Head: Normocephalic and atraumatic. Eyes: Pupils are equal, round, and reactive to light. EOM are normal.   Neck: Normal range of motion. Neck supple. No JVD present. No tracheal deviation present. No thyromegaly present. Cardiovascular: Normal rate, regular rhythm, normal heart sounds and intact distal pulses. Pulmonary/Chest: Effort normal and breath sounds normal. No stridor. No respiratory distress. He has no wheezes. He has no rales. He exhibits no tenderness. Abdominal: Soft. Bowel sounds are normal. He exhibits no distension and no mass. There is no tenderness. There is no rebound and no guarding. No hernia. Musculoskeletal: Normal range of motion. Lymphadenopathy:    Patient has no cervical adenopathy. Neurological: Patient is alert and oriented to person, place, and time.    Psychiatric: Patient has a normal mood and affect. Patient behavior is normal.       LABORATORY DATA: Reviewed  Lab Results   Component Value Date    WBC 2.7 (L) 08/19/2020    HGB 7.5 (L) 08/19/2020    HCT 25.8 (L) 08/19/2020    MCV 92.5 08/19/2020     08/19/2020     08/19/2020    K 3.9 08/19/2020     08/19/2020    CO2 28 08/19/2020    BUN 31 (H) 08/19/2020    CREATININE 1.20 08/19/2020    LABALBU 4.1 01/22/2020    BILITOT 0.59 01/22/2020    ALKPHOS 96 01/22/2020    AST 19 01/22/2020    ALT 13 01/22/2020    INR 1.2 08/19/2020         Lab Results   Component Value Date    RBC 2.79 (L) 08/19/2020    HGB 7.5 (L) 08/19/2020    MCV 92.5 08/19/2020    MCH 26.9 08/19/2020    MCHC 29.1 08/19/2020    RDW 17.2 (H) 08/19/2020    MPV 8.6 08/19/2020    BASOPCT 0 08/19/2020    LYMPHSABS 0.95 (L) 08/19/2020    MONOSABS 0.38 08/19/2020    NEUTROABS 1.30 (L) 08/19/2020    EOSABS <0.03 08/19/2020    BASOSABS <0.03 08/19/2020         DIAGNOSTIC TESTING:     Xr Chest Portable    Result Date: 8/19/2020  EXAMINATION: ONE XRAY VIEW OF THE CHEST 8/19/2020 4:48 pm COMPARISON: 08/16/2012 HISTORY: ORDERING SYSTEM PROVIDED HISTORY: shortness of breath TECHNOLOGIST PROVIDED HISTORY: shortness of breath Reason for Exam: Shortness of breath Acuity: Acute Type of Exam: Initial FINDINGS: Cardiomediastinal silhouette and pulmonary vasculature within normal limits. There is hazy density in the medial right base. No dense airspace consolidation. No pneumothorax or pleural effusion. No free air beneath the diaphragm. No evidence of acute osseous abnormality. Mild hazy density in the medial right lung base, possibly artifactual versus related to mild atelectasis or developing infiltrate.   Consider short-term follow-up with PA and lateral views of the chest.  No other acute findings in the chest.          IMPRESSION: Mr.Thomas Leander Woodruff is a 78 y.o. male with a past history remarkable for prior history of abdominal aortic aneurysm with vascular repair, CAD with prior PCI stent placement several years ago, on Plavix, carotid stenosis, COPD, prior history of hemorrhoidectomy, Parkinson's, chronic lower back pain, gait instability, referred for evaluation of acute on chronic anemia with obscure source    At this time patient denies any overt signs of GI hemorrhage. Denies any melena, hematochezia, bright blood per rectum. Patient had FIT testing in July which was negative favoring against a GI source of the patient's anemia    Currently the patient is at high cardiopulmonary risk for invasive procedures such as an EGD and a colonoscopy. CT abdomen pelvis with oral contrast on 8/24 was negative for any obvious occult neoplasm. PLAN:    1) Fecal occult blood screen (FIT) was negative. Will repeat occult testing to evaluate for potential obscure GI source of anemia. If positive, will proceed with EGD/Colonoscopy with caution and clearance from cardiology prior to proceeding. There is significant cardiopulmonary disease and risk with invasive procedures. Plavix would need to be held 5 days prior to procedure if considered. An procedure would need to be performed in the OR with close anesthesia supervision    Recent iron panel revealed low iron with normal to low TIBC (consistent with ACD) and again favoring against GI blood loss. Suspect there is a hypoproliferative component here versus anemia of chronic inflammation. We will follow-up repeat blood test and evaluate for stability of hemoglobin. 2) RTC in 2 weeks. Thank you for allowing me to participate in the care of Mr. Lele Valencia. For any further questions please do not hesitate to contact me. I have reviewed and agree with the MA/LPN ROS please refer to their documentation from today's encounter on a separate note.      Michael Duron MD, MPH   [facility]  Office #: [phone]          this note is created with the assistance of a speech recognition program.  While intending to generate a document that actually reflects the content of the visit, the document can still have some errors including those of syntax and sound a like substitutions which may escape proof reading. It such instances, actual meaning can be extrapolated by contextual diversion.

## 2020-08-25 NOTE — PROGRESS NOTES
Subjective:      Patient ID: Margaree Jeans is a 78 y.o. male. HPI    Review of Systems   Constitutional: Positive for fatigue. HENT: Negative. Eyes: Negative. Respiratory: Positive for cough and shortness of breath. Cardiovascular: Positive for palpitations. Gastrointestinal: Positive for blood in stool. Endocrine: Negative. Genitourinary: Negative. Musculoskeletal: Positive for gait problem. Skin: Negative. Allergic/Immunologic: Negative. Neurological: Positive for dizziness. Hematological: Bruises/bleeds easily. Psychiatric/Behavioral: Negative. All other systems reviewed and are negative.       Objective:   Physical Exam    Assessment:            Plan:

## 2020-08-26 NOTE — TELEPHONE ENCOUNTER
Patient's wife called concerned because Dr. Kenn Zamora informed her to watch his stool and to let him know if there are any changes from seeing him in the office yesterday. She said that today he has been having very dark and foul smelling diarrhea. She said that she cannot notice blood because it is diarrhea, but she said that it is allover. She hasn't been able to get the stool cultures done because nurses has been in and out of the house cleaning him up and she just can't get to the hospital to do it. Spoke with Liz FERRER in our office and she told me to tell the wife to take him to the ED. Wife verbalized she understood and would take him.

## 2020-08-28 NOTE — TELEPHONE ENCOUNTER
Patient's wife called stating that Luba Casey is currently admitted at the [facility] in [facility]. She said that she went to the outpatient lab to  the blood occult cards, but tech would not give it to her because of COVID. She said that she talked to the nurses at the hospital and they would fax over all his lab results once they are finalized, while he is admitted in the hospital. Provided Jesse Álvarez with our fax number.

## (undated) DEVICE — CYSTO/BLADDER IRRIGATION SET, REGULATING CLAMP

## (undated) DEVICE — Z DUP USE 2565107 PACK SURG PROC LEG CYSTO T-DRAPE REINF TBL CVR HND TWL

## (undated) DEVICE — POUCH DRNGE FLX BND INTEGR RAIL CLMP DISP EZ CTCH

## (undated) DEVICE — DRAINBAG,ANTI-REFLUX TOWER,L/F,2000ML,LL: Brand: MEDLINE

## (undated) DEVICE — CUTTING LOOP, BIPOLAR, 24/26 FR.: Brand: N.A.

## (undated) DEVICE — GLOVE SURG SZ 6 THK91MIL LTX FREE SYN POLYISOPRENE ANTI

## (undated) DEVICE — Z INACTIVE USE 2660664 SOLUTION IRRIG 3000ML 0.9% SOD CHL USP UROMATIC PLAS CONT

## (undated) DEVICE — GOWN,AURORA,NONREINFORCED,LARGE: Brand: MEDLINE

## (undated) DEVICE — CONTAINER SPEC 4OZ POLYPR WHT SCR TOP PEEL OPN PCH FOR ASEP

## (undated) DEVICE — GOWN,AURORA,NONRNF,XL,30/CS: Brand: MEDLINE

## (undated) DEVICE — STRAP,CATHETER,ELASTIC,HOOK&LOOP: Brand: MEDLINE

## (undated) DEVICE — GLOVE ORANGE PI 7 1/2   MSG9075

## (undated) DEVICE — JELLY LUBRICATING 4OZ FLIP TOP TB E Z

## (undated) DEVICE — CATHETER,FOLEY,3-WAY,22FR,30ML,100%SILI: Brand: MEDLINE

## (undated) DEVICE — SOLUTION SCRB 4OZ 4% CHG CLN BASE FOR PT SKIN ANTISEPSIS

## (undated) DEVICE — GLOVE ORANGE PI 7   MSG9070

## (undated) DEVICE — SODIUM CHL 09% SOL EXCEL

## (undated) DEVICE — SOLUTION IV IRRIG WATER 1000ML POUR BRL 2F7114

## (undated) DEVICE — GARMENT,MEDLINE,DVT,INT,CALF,MED, GEN2: Brand: MEDLINE